# Patient Record
Sex: FEMALE | Race: WHITE | NOT HISPANIC OR LATINO | Employment: OTHER | URBAN - METROPOLITAN AREA
[De-identification: names, ages, dates, MRNs, and addresses within clinical notes are randomized per-mention and may not be internally consistent; named-entity substitution may affect disease eponyms.]

---

## 2017-07-07 ENCOUNTER — OUTSIDE FACILITY SERVICE (OUTPATIENT)
Dept: NEUROLOGY | Facility: CLINIC | Age: 52
End: 2017-07-07

## 2017-07-07 ENCOUNTER — HOSPITAL ENCOUNTER (OUTPATIENT)
Dept: NEUROLOGY | Facility: HOSPITAL | Age: 52
Discharge: HOME | End: 2017-07-07
Attending: NURSE PRACTITIONER | Admitting: NURSE PRACTITIONER

## 2017-07-07 PROCEDURE — 95908 NRV CNDJ TST 3-4 STUDIES: CPT | Performed by: PSYCHIATRY & NEUROLOGY

## 2017-07-07 PROCEDURE — 95886 MUSC TEST DONE W/N TEST COMP: CPT | Performed by: PSYCHIATRY & NEUROLOGY

## 2018-05-18 ENCOUNTER — OUTSIDE FACILITY SERVICE (OUTPATIENT)
Dept: NEUROLOGY | Facility: CLINIC | Age: 53
End: 2018-05-18

## 2018-05-18 ENCOUNTER — HOSPITAL ENCOUNTER (OUTPATIENT)
Dept: MAMMOGRAPHY | Facility: HOSPITAL | Age: 53
Discharge: HOME | End: 2018-05-18
Attending: NURSE PRACTITIONER | Admitting: NURSE PRACTITIONER

## 2018-05-18 PROCEDURE — 95908 NRV CNDJ TST 3-4 STUDIES: CPT | Performed by: PSYCHIATRY & NEUROLOGY

## 2018-05-18 PROCEDURE — 95886 MUSC TEST DONE W/N TEST COMP: CPT | Performed by: PSYCHIATRY & NEUROLOGY

## 2018-11-30 ENCOUNTER — HOSPITAL ENCOUNTER (OUTPATIENT)
Dept: RHEUMATOLOGY | Facility: CLINIC | Age: 53
Discharge: HOME | End: 2018-11-30
Attending: INTERNAL MEDICINE | Admitting: INTERNAL MEDICINE

## 2018-12-06 ENCOUNTER — HOSPITAL ENCOUNTER (OUTPATIENT)
Dept: LAB | Facility: HOSPITAL | Age: 53
Discharge: HOME | End: 2018-12-06
Attending: INTERNAL MEDICINE | Admitting: INTERNAL MEDICINE

## 2018-12-06 LAB
ALBUMIN SERPL-MCNC: 3.8 G/DL (ref 3.5–4.8)
ALBUMIN SERPL-MCNC: 4 G/DL (ref 3.5–4.8)
ALBUMIN/GLOB SERPL: 1.2 {RATIO} (ref 1–1.7)
ALP SERPL-CCNC: 29 IU/L (ref 32–91)
ALPHA1 GLOB FLD ELPH-MCNC: 0.3 GM/DL (ref 0.1–0.4)
ALPHA2 GLOB SERPL ELPH-MCNC: 0.8 GM/DL (ref 0.5–1)
ALT SERPL-CCNC: 32 IU/L (ref 14–54)
ANION GAP SERPL CALC-SCNC: 12.5 MMOL/L (ref 10–20)
AST SERPL-CCNC: 62 IU/L (ref 15–41)
B-GLOBULIN SERPL ELPH-MCNC: 1.2 GM/DL (ref 0.7–1.4)
BILIRUB SERPL-MCNC: 0.7 MG/DL (ref 0.3–1.2)
BUN SERPL-MCNC: 21 MG/DL (ref 8–20)
BUN/CREAT SERPL: 17.5 (ref 5.4–26.2)
CALCIUM SERPL-MCNC: 9.6 MG/DL (ref 8.9–10.3)
CHLORIDE SERPL-SCNC: 97 MMOL/L (ref 101–111)
CK SERPL-CCNC: 62 IU/L (ref 38–234)
CONV CO2: 28 MMOL/L (ref 22–32)
CONV TOTAL PROTEIN: 6.8 G/DL (ref 6.1–7.9)
CONV TOTAL PROTEIN: 6.9 G/DL (ref 6.1–7.9)
CREAT UR-MCNC: 1.2 MG/DL (ref 0.4–1)
CRP SERPL-MCNC: 1.18 MG/DL (ref 0–0.7)
ERYTHROCYTE [DISTWIDTH] IN BLOOD BY AUTOMATED COUNT: 14.1 % (ref 11.5–14.5)
ERYTHROCYTE [SEDIMENTATION RATE] IN BLOOD BY WESTERGREN METHOD: 24 MM/HR (ref 0–30)
GAMMA GLOB SERPL ELPH-MCNC: 0.6 GM/DL (ref 0.6–1.6)
GLOBULIN UR ELPH-MCNC: 3.1 G/DL (ref 2.5–3.8)
GLUCOSE SERPL-MCNC: 261 MG/DL (ref 65–99)
HCT VFR BLD AUTO: 37.6 % (ref 35–49)
HGB BLD-MCNC: 12.5 G/DL (ref 12–15)
INSULIN SERPL-ACNC: NORMAL U[IU]/ML
MCH RBC QN AUTO: 29.6 PG (ref 26–32)
MCHC RBC AUTO-ENTMCNC: 33.2 G/DL (ref 32–36)
MCV RBC AUTO: 89.2 FL (ref 80–94)
PLATELET # BLD AUTO: 221 10*3/UL (ref 150–450)
PMV BLD AUTO: 10.4 FL (ref 7.4–10.4)
POTASSIUM SERPL-SCNC: 4.5 MMOL/L (ref 3.6–5.1)
RBC # BLD AUTO: 4.22 10*6/UL (ref 4–5.4)
SODIUM SERPL-SCNC: 133 MMOL/L (ref 136–144)
TSH SERPL-ACNC: 3.38 UIU/ML (ref 0.34–5.6)
WBC # BLD AUTO: 7 10*3/UL (ref 4.5–11.5)

## 2018-12-06 PROCEDURE — 86481 TB AG RESPONSE T-CELL SUSP: CPT

## 2018-12-07 ENCOUNTER — LAB REQUISITION (OUTPATIENT)
Dept: LAB | Facility: HOSPITAL | Age: 53
End: 2018-12-07

## 2018-12-07 DIAGNOSIS — Z00.00 ROUTINE GENERAL MEDICAL EXAMINATION AT A HEALTH CARE FACILITY: ICD-10-CM

## 2018-12-07 LAB
25(OH)D3 SERPL-MCNC: 37 NG/ML (ref 30–100)
HAV IGM SERPL QL IA: NONREACTIVE
HBV CORE IGM SERPL QL IA: NONREACTIVE
HBV SURFACE AG SERPL QL IA: NONREACTIVE
HCV AB SER DONR QL: NONREACTIVE

## 2018-12-08 LAB
TSPOT INTERPRETATION: NEGATIVE
TSPOT NIL CONTROL INTERPRETATION: NORMAL
TSPOT PANEL A: 0
TSPOT PANEL B: 0
TSPOT POS CONTROL INTERPRETATION: NORMAL

## 2018-12-10 LAB
ACE SERPL-CCNC: 8 U/L (ref 9–67)
TSPOT INTERPRETATION: NEGATIVE

## 2019-07-17 ENCOUNTER — OFFICE VISIT (OUTPATIENT)
Dept: RHEUMATOLOGY | Facility: CLINIC | Age: 54
End: 2019-07-17

## 2019-07-17 VITALS
DIASTOLIC BLOOD PRESSURE: 75 MMHG | SYSTOLIC BLOOD PRESSURE: 125 MMHG | HEART RATE: 62 BPM | HEIGHT: 64 IN | WEIGHT: 194 LBS | BODY MASS INDEX: 33.12 KG/M2

## 2019-07-17 DIAGNOSIS — E11.610 TYPE 2 DIABETES MELLITUS WITH DIABETIC NEUROPATHIC ARTHROPATHY, WITHOUT LONG-TERM CURRENT USE OF INSULIN (HCC): Primary | ICD-10-CM

## 2019-07-17 DIAGNOSIS — R76.8 POSITIVE ANA (ANTINUCLEAR ANTIBODY): ICD-10-CM

## 2019-07-17 DIAGNOSIS — G89.29 CHRONIC BILATERAL LOW BACK PAIN WITHOUT SCIATICA: ICD-10-CM

## 2019-07-17 DIAGNOSIS — R79.89 ABNORMAL LIVER FUNCTION TESTS: ICD-10-CM

## 2019-07-17 DIAGNOSIS — M06.4 UNDIFFERENTIATED INFLAMMATORY POLYARTHRITIS (HCC): ICD-10-CM

## 2019-07-17 DIAGNOSIS — M54.50 CHRONIC BILATERAL LOW BACK PAIN WITHOUT SCIATICA: ICD-10-CM

## 2019-07-17 PROBLEM — N18.30 CHRONIC RENAL INSUFFICIENCY, STAGE III (MODERATE): Status: ACTIVE | Noted: 2019-01-09

## 2019-07-17 PROBLEM — M54.9 BACK PAIN: Status: ACTIVE | Noted: 2018-11-30

## 2019-07-17 PROCEDURE — 99214 OFFICE O/P EST MOD 30 MIN: CPT | Performed by: INTERNAL MEDICINE

## 2019-07-17 RX ORDER — FLUOXETINE 20 MG/1
1 TABLET, FILM COATED ORAL DAILY
COMMUNITY
Start: 2019-07-15 | End: 2021-09-28

## 2019-07-17 RX ORDER — GLYCOPYRROLATE 2 MG/1
1 TABLET ORAL 2 TIMES DAILY
COMMUNITY
Start: 2017-11-22

## 2019-07-17 RX ORDER — CETIRIZINE HYDROCHLORIDE 10 MG/1
1 TABLET ORAL DAILY
COMMUNITY

## 2019-07-17 RX ORDER — GABAPENTIN 300 MG/1
300 CAPSULE ORAL 4 TIMES DAILY
COMMUNITY
End: 2020-01-20 | Stop reason: SDUPTHER

## 2019-07-17 RX ORDER — HYDROXYCHLOROQUINE SULFATE 200 MG/1
1 TABLET, FILM COATED ORAL DAILY
Refills: 1 | COMMUNITY
Start: 2019-05-13 | End: 2019-07-17 | Stop reason: SDUPTHER

## 2019-07-17 RX ORDER — OMEPRAZOLE 40 MG/1
40 CAPSULE, DELAYED RELEASE ORAL DAILY
Refills: 3 | COMMUNITY
Start: 2019-07-05

## 2019-07-17 RX ORDER — LANCETS 28 GAUGE
EACH MISCELLANEOUS
COMMUNITY
Start: 2016-08-16 | End: 2021-07-27 | Stop reason: SDUPTHER

## 2019-07-17 RX ORDER — SIMVASTATIN 20 MG
1 TABLET ORAL DAILY
COMMUNITY
Start: 2019-07-15 | End: 2019-08-27 | Stop reason: SDUPTHER

## 2019-07-17 RX ORDER — LISINOPRIL 5 MG/1
1 TABLET ORAL DAILY
COMMUNITY
Start: 2019-07-16

## 2019-07-17 RX ORDER — HYDROXYCHLOROQUINE SULFATE 200 MG/1
200 TABLET, FILM COATED ORAL DAILY
Qty: 90 TABLET | Refills: 1 | Status: SHIPPED | OUTPATIENT
Start: 2019-07-17 | End: 2020-01-20 | Stop reason: SDUPTHER

## 2019-07-17 NOTE — PATIENT INSTRUCTIONS
Diabetes and Exercise  Diabetes mellitus is a common, chronic disease in which a person's blood sugar (glucose) levels are often too high. Getting exercise on a regular basis is an important part of diabetes treatment.  WHY SHOULD I EXERCISE REGULARLY IF I HAVE DIABETES?  Exercising regularly provides many benefits for people who have diabetes. Some of these benefits include:  · Lowering your blood glucose level.  · Maintaining a healthy body weight and structure.  · Reducing your risk of heart disease by:    Lowering your LDL cholesterol levels. LDL is sometimes called bad cholesterol.    Lowering your triglyceride levels.    Raising your HDL cholesterol levels. HDL is sometimes called good cholesterol.    Decreasing your blood pressure.  · Lowering your hemoglobin A1C levels. A1C is a blood test that determines your average blood glucose level over a 3-month period. Doing resistance exercises on a regular basis can help to lower these levels.  · Improving your body's ability to use insulin and glucose.  WHAT TYPES OF EXERCISE ARE RECOMMENDED FOR PEOPLE WHO HAVE DIABETES?  A combination of resistance exercises and aerobic exercise is recommended for people who have diabetes. Resistance exercises are those that you do with free weights or weight machines. Aerobic exercise includes any exercise that raises your heart rate and breathing rate for a sustained amount of time. This can include activities such as:  · Brisk walking.  · Water or dry-land aerobics.  · Bicycling or riding a stationary bike.  · Jogging.  · Dancing.  · Hiking.  · Moderate to vigorous gardening.  Ask your health care provider what types of exercise are safe for you.  HOW LONG AND HOW OFTEN SHOULD I EXERCISE?  The American Diabetes Association and the [place] Department of Health recommend the following:  · Children who have diabetes or are at risk of developing diabetes (have prediabetes) should get 1 hour or more of exercise every day.  · Adults  who have diabetes should exercise with moderate intensity for 150 minutes or more per week. Moderate-intensity exercise is any exercise that raises your heart rate to 50-70% of your maximum heart rate. Ask your health care provider how to calculate this.  · Adults who have diabetes should spread exercise over at least 3 days per week, but they should not go more than 2 days in a row without exercising.  · Adults who have diabetes should do resistance exercise at least 2 days per week.  · Adults who have diabetes should avoid sitting for more than 90 minutes at a time.  WHAT SHOULD I DO BEFORE I START AN EXERCISE PROGRAM?  · Talk with your health care provider before you start a new exercise program. If you have, or are at risk for, certain medical conditions, you may need to have a physical exam and testing. Testing may include:    A chest X-ray.    An electrocardiogram (ECG). This test checks the electrical functioning of your heart.    Blood tests.  · If you have type 1 diabetes, talk with your health care provider about managing your blood glucose levels with insulin before, during, and after exercise.  · Understand that exercise affects blood glucose levels differently depending on how long you exercise and depending on other factors such as whether you are sick. Be ready to treat high blood glucose (hyperglycemia) and low blood glucose (hypoglycemia) right away if either occurs during or after exercise.  · Talk with your health care provider about diabetes-related problems that can occur during exercise. Your health care provider can help you to decide on an exercise plan that allows you to avoid these problems. This plan might include instructions about choosing the proper footwear for exercise and staying well hydrated during and after exercise.  HOW CAN I MANAGE MY BLOOD GLUCOSE LEVEL WHILE I EXERCISE?  · Eat 1-3 hours before you exercise.  · Check your blood glucose level immediately before and after  exercise.  · Do not start exercising if:    Your blood glucose is more than 250 mg/dL and you do not feel well.    You have ketones in your urine.    Your blood glucose is less than 100 mg/dL.  · If you do not feel well while exercising, take a break and check your blood glucose.  · Stop exercising if you find that your blood glucose has dropped below 100 mg/dL. If this occurs, do the following:    Eat a 15-gram to 20-gram carbohydrate-rich snack.    Recheck your blood glucose level.    If your blood glucose level does not rise above 100 mg/dL, have another 15-gram to 20-gram carbohydrate snack.  · Stop exercising if you find that your blood glucose has risen above 250 mg/dL while exercising.  · Do not go on with your workout until:    Your blood glucose level rises above 100 mg/dL if it was below this level.    Your blood glucose level drops below 250 mg/dL if it was above this level.  · If you take insulin, you may need to alter your insulin schedule on the days that you exercise. This depends on how your blood glucose responds to exercise. Ask your health care provider how to do this.  · Drink fluids during and after exercise to avoid dehydration. For exercise that lasts a long time, use a sports drink to maintain your glucose level.  SEEK MEDICAL CARE IF:  · You have stopped exercising because of hypoglycemia and your blood glucose does not rise above 100 mg/dL after you have two 15-gram to 20-gram carbohydrate-rich snacks.  · You notice a loss of sensation in your feet during or after exercise.  · You have increased numbness, tingling, or pins-and-needles sensations after exercise.  · You have a fast, irregular heartbeat (palpitations) during or after exercise.  · Your exercise tolerance gets worse.  · You have dizzy spells or feel faint for brief periods during or after exercise.  SEEK IMMEDIATE MEDICAL CARE IF:  · You have chest pain during or after exercise.  · You pass out (lose consciousness) during or  after exercise.  · You have the following in addition to hyperglycemia:    Fatigue.    Dry or flushed skin.    Difficulty breathing.    Confusion.    Nausea, vomiting, or pain in the abdomen.    Fruity-smelling breath.     This information is not intended to replace advice given to you by your health care provider. Make sure you discuss any questions you have with your health care provider.     Document Released: 12/18/2006 Document Revised: 09/07/2016 Document Reviewed: 02/16/2016  [employer] Interactive Patient Education ©2017 [employer]

## 2019-07-17 NOTE — PROGRESS NOTES
Subjective   Chief Complaint   Patient presents with   • Joint Pain        Faye De La Fuente is a 53 y.o. female.     History of Present Illness    She is a 53 years old overnourished white female with a history of the diabetes and also arthritis and hip and back pains and abnormal liver function tests diagnosed with a fatty liver that currently take Plaquenil 1 a day and also taking Neurontin 300 mg 3-4 times a day.  She claimed that her diabetes is fairly good control and watching her diet and lost a few pounds.  Also have some numbness and tingling in her hands and the feet and was told that she had diabetic neuropathy.  She has persistent elevation of the liver enzyme due to the fatty liver.  She has morning stiffness immobility stiffness but overall does not last for long.  Of the time.  And she had a eye examination done recently which was okay as far as the Plaquenil toxicity or concern.  No fever no chills.  She has positive ARTHUR test but so far does not have any significant clinical feature of the lupus such as pleuritic pain or rainouts or sores in the mouth or sores in her nose.  She has off-and-on pains and aches in her joints and lower back.    The following portions of the patient's history were reviewed and updated as appropriate: allergies, current medications, past family history, past medical history, past social history, past surgical history and problem list.    Past Medical History:   Diagnosis Date   • CAD (coronary artery disease)    • Diabetes mellitus (CMS/HCC)    • Diabetes mellitus type I (CMS/HCC)    • GERD (gastroesophageal reflux disease)    • Hyperlipidemia    • Hypertension    • Myocardial infarction (CMS/HCC) 2011   • Vitamin D deficiency       Past Surgical History:   Procedure Laterality Date   • BLADDER SURGERY  2011    bladder mesh   • CHOLECYSTECTOMY     • COLONOSCOPY     • ENDOSCOPY     • EYE SURGERY  2018   • HYSTERECTOMY  2009     Family History   Problem Relation Age of  Onset   • Diabetes Mother    • Hypertension Mother    • Diabetes Father    • Hypertension Father    • Stroke Father    • Diabetes Paternal Grandmother    • Diabetes Paternal Grandfather    • Diabetes Other         aunt   • Cancer Other         brain cancer - uncle   • Cancer Other         breast cancer - aunt      Social History     Socioeconomic History   • Marital status:      Spouse name: Peterson De La Fuente   • Number of children: 3   • Years of education: Not on file   • Highest education level: Not on file   Tobacco Use   • Smoking status: Never Smoker   • Smokeless tobacco: Never Used   Substance and Sexual Activity   • Alcohol use: No     Frequency: Never   • Drug use: No     Allergies   Allergen Reactions   • Hydrocodone Unknown (See Comments)   • Sulfamethoxazole-Trimethoprim Unknown (See Comments)   • Tramadol Hcl Unknown (See Comments)   • Ciprofloxacin Unknown (See Comments)        Current Outpatient Medications:   •  aspirin 81 MG tablet, Take 1 tablet by mouth Daily., Disp: , Rfl:   •  cetirizine (zyrTEC) 10 MG tablet, Take 1 tablet by mouth Daily., Disp: , Rfl:   •  FLUoxetine (PROzac) 20 MG tablet, Take 1 tablet by mouth Daily., Disp: , Rfl:   •  gabapentin (NEURONTIN) 300 MG capsule, Take 300 mg by mouth 4 (Four) Times a Day., Disp: , Rfl:   •  glucagon (GLUCAGON EMERGENCY) 1 MG injection, Take As Directed., Disp: , Rfl:   •  glucose blood (FREESTYLE LITE) test strip, FREESTYLE LITE TEST STRP, Disp: , Rfl:   •  glycopyrrolate (ROBINUL) 2 MG tablet, Take 1 tablet by mouth 2 (Two) Times a Day., Disp: , Rfl:   •  hydroxychloroquine (PLAQUENIL) 200 MG tablet, Take 1 tablet by mouth Daily., Disp: 90 tablet, Rfl: 1  •  insulin glulisine (APIDRA) 100 UNIT/ML injection, Use as directed in insulin pump,  units per day. DX: E10.65, Disp: 150 mL, Rfl: 0  •  Lancets (FREESTYLE) lancets, 5 times daily, Disp: , Rfl:   •  lisinopril (PRINIVIL,ZESTRIL) 5 MG tablet, Take 1 tablet by mouth Daily., Disp: ,  Rfl:   •  metoprolol tartrate (LOPRESSOR) 25 MG tablet, Take 25 mg by mouth 2 (Two) Times a Day., Disp: , Rfl: 1  •  omeprazole (priLOSEC) 40 MG capsule, Take 40 mg by mouth Daily., Disp: , Rfl: 3  •  simvastatin (ZOCOR) 20 MG tablet, Take 1 tablet by mouth Daily., Disp: , Rfl:      Review of System    Appetite is okay she does not have any significant GI  or respiratory problem at the present time.  No significant ears nose throat problem.  Except for lower back pain and joint pain otherwise she is doing reasonably okay.  Rest of the review of the system is unremarkable.    Objective   Physical Exam    She is well-built white female alert orientated cooperative does not appear in significant distress her gait and postures are fairly normal.  HEENT is unremarkable.  Chest is clear to auscultation percussion.  Cardiovascular.  S1 and S2 there is no S3 no murmur.  Abdomen.  There is no significant tenderness bowel sounds are present.  Neurologic exam: Deep tendon reflexes are sluggish in upper and lower extremities toe flexor and extensor are normal vibration tests are normal sensation to pinprick is fairly normal.  Joint exam: Range of motion of the joint there is mild decreased range of motion of the spine and the hips.  Joint tenderness there is mild tenderness over the lumbar spine and also shoulders and couple of metacarpal phalanges PIP joints and knees.  Joint swelling.  There is no significant swelling in any joints.  Muscle exam she has very minimal muscle weakness in the lower extremities  are also slightly decreased.  There is no muscle atrophy.  His skin there is no significant rash no subcutaneous notes no pedal edema very minimal varicose of the vein.  No significant calf tenderness.    Assessment/Plan   Problem List Items Addressed This Visit        Endocrine    Diabetes mellitus (CMS/[facility]) - Primary    Relevant Medications    insulin glulisine (APIDRA) 100 UNIT/ML injection    glucagon (GLUCAGON  EMERGENCY) 1 MG injection       Nervous and Auditory    Back pain    Relevant Medications    hydroxychloroquine (PLAQUENIL) 200 MG tablet    Other Relevant Orders    Comprehensive Metabolic Panel    CBC Auto Differential    C-reactive Protein    C3 Complement    C4 Complement    Sedimentation Rate    Urinalysis, Microscopic Only - Urine, Clean Catch       Musculoskeletal and Integument    Undifferentiated inflammatory polyarthritis (CMS/HCC)    Relevant Medications    hydroxychloroquine (PLAQUENIL) 200 MG tablet    Other Relevant Orders    Comprehensive Metabolic Panel    CBC Auto Differential    C-reactive Protein    C3 Complement    C4 Complement    Sedimentation Rate    Urinalysis, Microscopic Only - Urine, Clean Catch       Other    Abnormal liver function tests    Relevant Medications    hydroxychloroquine (PLAQUENIL) 200 MG tablet    Other Relevant Orders    Comprehensive Metabolic Panel    CBC Auto Differential    C-reactive Protein    C3 Complement    C4 Complement    Sedimentation Rate    Urinalysis, Microscopic Only - Urine, Clean Catch    Positive ARTHUR (antinuclear antibody)    Relevant Medications    hydroxychloroquine (PLAQUENIL) 200 MG tablet    Other Relevant Orders    Comprehensive Metabolic Panel    CBC Auto Differential    C-reactive Protein    C3 Complement    C4 Complement    Sedimentation Rate    Urinalysis, Microscopic Only - Urine, Clean Catch          I suggested continue with the current medication and exercise regularly watch her diet and continue to lose weight and monitor hemoglobin A1c.  Patient also advised that participate in aquatic exercise program during summertime.  And avoid have any alcoholic beverage because of abnormal liver function test.  I will see her again sometimes in December and have her blood work 710 days prior to the next office visit.  Patient Instructions   Diabetes and Exercise  Diabetes mellitus is a common, chronic disease in which a person's blood sugar  (glucose) levels are often too high. Getting exercise on a regular basis is an important part of diabetes treatment.  WHY SHOULD I EXERCISE REGULARLY IF I HAVE DIABETES?  Exercising regularly provides many benefits for people who have diabetes. Some of these benefits include:  · Lowering your blood glucose level.  · Maintaining a healthy body weight and structure.  · Reducing your risk of heart disease by:    Lowering your LDL cholesterol levels. LDL is sometimes called bad cholesterol.    Lowering your triglyceride levels.    Raising your HDL cholesterol levels. HDL is sometimes called good cholesterol.    Decreasing your blood pressure.  · Lowering your hemoglobin A1C levels. A1C is a blood test that determines your average blood glucose level over a 3-month period. Doing resistance exercises on a regular basis can help to lower these levels.  · Improving your body's ability to use insulin and glucose.  WHAT TYPES OF EXERCISE ARE RECOMMENDED FOR PEOPLE WHO HAVE DIABETES?  A combination of resistance exercises and aerobic exercise is recommended for people who have diabetes. Resistance exercises are those that you do with free weights or weight machines. Aerobic exercise includes any exercise that raises your heart rate and breathing rate for a sustained amount of time. This can include activities such as:  · Brisk walking.  · Water or dry-land aerobics.  · Bicycling or riding a stationary bike.  · Jogging.  · Dancing.  · Hiking.  · Moderate to vigorous gardening.  Ask your health care provider what types of exercise are safe for you.  HOW LONG AND HOW OFTEN SHOULD I EXERCISE?  The American Diabetes Association and the [place] Department of Health recommend the following:  · Children who have diabetes or are at risk of developing diabetes (have prediabetes) should get 1 hour or more of exercise every day.  · Adults who have diabetes should exercise with moderate intensity for 150 minutes or more per week.  Moderate-intensity exercise is any exercise that raises your heart rate to 50-70% of your maximum heart rate. Ask your health care provider how to calculate this.  · Adults who have diabetes should spread exercise over at least 3 days per week, but they should not go more than 2 days in a row without exercising.  · Adults who have diabetes should do resistance exercise at least 2 days per week.  · Adults who have diabetes should avoid sitting for more than 90 minutes at a time.  WHAT SHOULD I DO BEFORE I START AN EXERCISE PROGRAM?  · Talk with your health care provider before you start a new exercise program. If you have, or are at risk for, certain medical conditions, you may need to have a physical exam and testing. Testing may include:    A chest X-ray.    An electrocardiogram (ECG). This test checks the electrical functioning of your heart.    Blood tests.  · If you have type 1 diabetes, talk with your health care provider about managing your blood glucose levels with insulin before, during, and after exercise.  · Understand that exercise affects blood glucose levels differently depending on how long you exercise and depending on other factors such as whether you are sick. Be ready to treat high blood glucose (hyperglycemia) and low blood glucose (hypoglycemia) right away if either occurs during or after exercise.  · Talk with your health care provider about diabetes-related problems that can occur during exercise. Your health care provider can help you to decide on an exercise plan that allows you to avoid these problems. This plan might include instructions about choosing the proper footwear for exercise and staying well hydrated during and after exercise.  HOW CAN I MANAGE MY BLOOD GLUCOSE LEVEL WHILE I EXERCISE?  · Eat 1-3 hours before you exercise.  · Check your blood glucose level immediately before and after exercise.  · Do not start exercising if:    Your blood glucose is more than 250 mg/dL and you do  not feel well.    You have ketones in your urine.    Your blood glucose is less than 100 mg/dL.  · If you do not feel well while exercising, take a break and check your blood glucose.  · Stop exercising if you find that your blood glucose has dropped below 100 mg/dL. If this occurs, do the following:    Eat a 15-gram to 20-gram carbohydrate-rich snack.    Recheck your blood glucose level.    If your blood glucose level does not rise above 100 mg/dL, have another 15-gram to 20-gram carbohydrate snack.  · Stop exercising if you find that your blood glucose has risen above 250 mg/dL while exercising.  · Do not go on with your workout until:    Your blood glucose level rises above 100 mg/dL if it was below this level.    Your blood glucose level drops below 250 mg/dL if it was above this level.  · If you take insulin, you may need to alter your insulin schedule on the days that you exercise. This depends on how your blood glucose responds to exercise. Ask your health care provider how to do this.  · Drink fluids during and after exercise to avoid dehydration. For exercise that lasts a long time, use a sports drink to maintain your glucose level.  SEEK MEDICAL CARE IF:  · You have stopped exercising because of hypoglycemia and your blood glucose does not rise above 100 mg/dL after you have two 15-gram to 20-gram carbohydrate-rich snacks.  · You notice a loss of sensation in your feet during or after exercise.  · You have increased numbness, tingling, or pins-and-needles sensations after exercise.  · You have a fast, irregular heartbeat (palpitations) during or after exercise.  · Your exercise tolerance gets worse.  · You have dizzy spells or feel faint for brief periods during or after exercise.  SEEK IMMEDIATE MEDICAL CARE IF:  · You have chest pain during or after exercise.  · You pass out (lose consciousness) during or after exercise.  · You have the following in addition to hyperglycemia:    Fatigue.    Dry or  flushed skin.    Difficulty breathing.    Confusion.    Nausea, vomiting, or pain in the abdomen.    Fruity-smelling breath.     This information is not intended to replace advice given to you by your health care provider. Make sure you discuss any questions you have with your health care provider.     Document Released: 12/18/2006 Document Revised: 09/07/2016 Document Reviewed: 02/16/2016  [employer] Interactive Patient Education ©2017 [employer]

## 2019-08-20 PROBLEM — Z82.3 FAMILY HISTORY OF STROKE: Status: ACTIVE | Noted: 2019-08-20

## 2019-08-20 PROBLEM — G62.9 NEUROPATHY: Status: ACTIVE | Noted: 2017-11-22

## 2019-08-20 PROBLEM — M54.9 BACK PAIN WITH RADIATION: Status: ACTIVE | Noted: 2017-12-01

## 2019-08-20 PROBLEM — M25.551 HIP PAIN, RIGHT: Status: ACTIVE | Noted: 2018-11-30

## 2019-08-20 PROBLEM — M79.643 PAIN OF HAND: Status: ACTIVE | Noted: 2018-11-30

## 2019-08-20 PROBLEM — M54.16 LUMBAR RADICULOPATHY: Status: ACTIVE | Noted: 2017-12-01

## 2019-08-20 PROBLEM — Z83.3 FAMILY HISTORY OF DIABETES MELLITUS: Status: ACTIVE | Noted: 2019-08-20

## 2019-08-20 RX ORDER — ERGOCALCIFEROL 1.25 MG/1
50000 CAPSULE ORAL 2 TIMES WEEKLY
COMMUNITY
Start: 2013-10-03 | End: 2019-08-27 | Stop reason: ALTCHOICE

## 2019-08-20 RX ORDER — BLOOD-GLUCOSE METER
KIT MISCELLANEOUS
COMMUNITY
Start: 2018-11-16

## 2019-08-20 RX ORDER — DICYCLOMINE HYDROCHLORIDE 10 MG/1
10 CAPSULE ORAL 3 TIMES DAILY
COMMUNITY
End: 2019-08-27 | Stop reason: ALTCHOICE

## 2019-08-20 RX ORDER — FLUOXETINE HYDROCHLORIDE 20 MG/1
CAPSULE ORAL
COMMUNITY
Start: 2017-08-24 | End: 2019-08-27

## 2019-08-27 ENCOUNTER — OFFICE VISIT (OUTPATIENT)
Dept: ENDOCRINOLOGY | Facility: CLINIC | Age: 54
End: 2019-08-27

## 2019-08-27 VITALS
HEART RATE: 61 BPM | OXYGEN SATURATION: 98 % | HEIGHT: 62 IN | DIASTOLIC BLOOD PRESSURE: 62 MMHG | WEIGHT: 195 LBS | SYSTOLIC BLOOD PRESSURE: 118 MMHG | BODY MASS INDEX: 35.88 KG/M2

## 2019-08-27 DIAGNOSIS — I10 HYPERTENSION, BENIGN: ICD-10-CM

## 2019-08-27 DIAGNOSIS — N18.30 CHRONIC RENAL INSUFFICIENCY, STAGE III (MODERATE) (HCC): ICD-10-CM

## 2019-08-27 DIAGNOSIS — E78.2 MIXED HYPERLIPIDEMIA: ICD-10-CM

## 2019-08-27 DIAGNOSIS — E10.65 TYPE 1 DIABETES MELLITUS WITH HYPERGLYCEMIA (HCC): Primary | ICD-10-CM

## 2019-08-27 DIAGNOSIS — E11.42 DIABETIC PERIPHERAL NEUROPATHY (HCC): ICD-10-CM

## 2019-08-27 PROCEDURE — 99214 OFFICE O/P EST MOD 30 MIN: CPT | Performed by: INTERNAL MEDICINE

## 2019-08-27 RX ORDER — SIMVASTATIN 40 MG
20 TABLET ORAL
COMMUNITY
Start: 2019-07-25 | End: 2021-09-28

## 2019-08-27 NOTE — PATIENT INSTRUCTIONS
Continue current medications and follow-up in months with labs.  Please keep glucose source with you all the time and check blood sugars before driving and needs to be above 100 and keep Glucagon Emergency Kit ready all the time.  Is get annual eye exam and flu shot.

## 2019-08-27 NOTE — PROGRESS NOTES
Endocrine Progress Note Outpatient     Patient Care Team:  Nalini Quezada APRN as PCP - General    Chief Complaint: Follow-up type 1 diabetes    HPI: 53-year-old female with history of type 1 diabetes since 1997, also history of hypertension, hyperlipidemia and diabetic neuropathy is here for follow-up.  For type 2 diabetes she is currently on [employer] insulin pump, current model is 530.  Her insulin pump settings are as follows basal rate: Midnight 2.45 units/h, 4 AM 2.85 units/h, 6 AM 2.95 units/h, 10 AM 2.75 units/h.  Insulin carb ratios midnight 1 unit for each 4.3 g of carbohydrate, 4 PM 1 unit for each 3.7 g of carbohydrate.  Insulin sensitive factor was 1 unit for each 10 mg blood sugar with a target blood sugar of 100-1 35 and active insulin 4 hours.  Feels like his current settings are working pretty good for her.  Unfortunately she did not bring blood sugar records for review and we are not able to download her insulin pump due to some technical issues.  Hypertension: Well-controlled  Hyperlipidemia: On simvastatin and fenofibrate  Diabetic neuropathy: She has right foot drop    Past Medical History:   Diagnosis Date   • CAD (coronary artery disease)    • Diabetes mellitus (CMS/[facility])    • Diabetes mellitus type I (CMS/[facility])    • GERD (gastroesophageal reflux disease)    • Hyperlipidemia    • Hypertension    • Myocardial infarction (CMS/[facility]) 2011   • Vitamin D deficiency        Social History     Socioeconomic History   • Marital status:      Spouse name: Peterson De La Fuente   • Number of children: 3   • Years of education: Not on file   • Highest education level: Not on file   Tobacco Use   • Smoking status: Never Smoker   • Smokeless tobacco: Never Used   Substance and Sexual Activity   • Alcohol use: No     Frequency: Never   • Drug use: No       Family History   Problem Relation Age of Onset   • Diabetes Mother    • Hypertension Mother    • Diabetes Father    • Hypertension Father    • Stroke  Father    • Diabetes Paternal Grandmother    • Diabetes Paternal Grandfather    • Diabetes Other         aunt   • Cancer Other         brain cancer - uncle   • Cancer Other         breast cancer - aunt       Allergies   Allergen Reactions   • Ciprofloxacin Unknown (See Comments)   • Hydrocodone Unknown (See Comments)   • Macadamia Nut Oil Unknown (See Comments)   • Nitrofurantoin Unknown (See Comments)   • Nuts Unknown (See Comments)   • Sulfamethoxazole-Trimethoprim Unknown (See Comments)   • Tramadol Hcl Unknown (See Comments)       ROS:   Constitutional:  Admit fatigue, tiredness.    Eyes:  Denies change in visual acuity   HENT:  Denies nasal congestion or sore throat   Respiratory: denies cough, shortness of breath.   Cardiovascular:  denies chest pain, edema   GI:  Denies abdominal pain, nausea, vomiting.   Musculoskeletal:  Denies back pain or joint pain   Integument:  Denies dry skin and rash   Neurologic:  Denies headache, focal weakness or sensory changes   Endocrine:  Denies polyuria or polydipsia   Psychiatric:  Denies depression or anxiety      Vitals:    08/27/19 1441   BP: 118/62   Pulse: 61   SpO2: 98%       Physical Exam:  GEN: NAD, conversant  EYES: EOMI, PERRL, no conjunctival erythema  NECK: no thyromegaly, full ROM   CV: RRR, no murmurs/rubs/gallops, no peripheral edema  LUNG: CTAB, no wheezes/rales/ronchi  SKIN: no rashes, no acanthosis  MSK: no deformities, full ROM of all extremities  NEURO: no tremors, DTR normal  PSYCH: AOX3, appropriate mood, affect normal  Feet: Has callus on left sole, no ulcer seen    Results Review:     I reviewed the patient's new clinical results.    No results found for: HGBA1C   Lab Results   Component Value Date    GLUCOSE 261 (H) 12/06/2018    BUN 21 (H) 12/06/2018    CREATININE 1.2 (H) 12/06/2018    BCR 17.5 12/06/2018    K 4.5 12/06/2018    CO2 28 12/06/2018    CALCIUM 9.6 12/06/2018    ALBUMIN 4.0 12/06/2018    ALBUMIN 3.8 12/06/2018    LABIL2 1.2 12/06/2018     AST 62 (H) 12/06/2018    ALT 32 12/06/2018     Lab Results   Component Value Date    TSH 3.38 12/06/2018     Labs from August 9, 2019 showed an A1c of 7.4%, BUN 19, creatinine 1.4, sodium 139, potassium 4.9, chloride 101, 0 25, glucose 173.    Medication Review: Reviewed.       Current Outpatient Medications:   •  aspirin 81 MG tablet, Take 1 tablet by mouth Daily., Disp: , Rfl:   •  Blood Glucose Monitoring Suppl (FREESTYLE LITE) device, FREESTYLE LITE VERENICE, Disp: , Rfl:   •  cetirizine (zyrTEC) 10 MG tablet, Take 1 tablet by mouth Daily., Disp: , Rfl:   •  FLUoxetine (PROzac) 20 MG tablet, Take 1 tablet by mouth Daily., Disp: , Rfl:   •  gabapentin (NEURONTIN) 300 MG capsule, Take 300 mg by mouth 4 (Four) Times a Day., Disp: , Rfl:   •  glucagon (GLUCAGON EMERGENCY) 1 MG injection, Take As Directed., Disp: , Rfl:   •  glucose blood (FREESTYLE LITE) test strip, FREESTYLE LITE TEST STRP, Disp: , Rfl:   •  glycopyrrolate (ROBINUL) 2 MG tablet, Take 1 tablet by mouth 2 (Two) Times a Day., Disp: , Rfl:   •  hydroxychloroquine (PLAQUENIL) 200 MG tablet, Take 1 tablet by mouth Daily., Disp: 90 tablet, Rfl: 1  •  insulin glulisine (APIDRA) 100 UNIT/ML injection, Use as directed in insulin pump,  units per day. DX: E10.65, Disp: 150 mL, Rfl: 0  •  Lancets (FREESTYLE) lancets, 5 times daily, Disp: , Rfl:   •  lisinopril (PRINIVIL,ZESTRIL) 5 MG tablet, Take 1 tablet by mouth Daily., Disp: , Rfl:   •  metoprolol tartrate (LOPRESSOR) 25 MG tablet, Take 25 mg by mouth 2 (Two) Times a Day., Disp: , Rfl: 1  •  omeprazole (priLOSEC) 40 MG capsule, Take 40 mg by mouth Daily., Disp: , Rfl: 3  •  simvastatin (ZOCOR) 40 MG tablet, , Disp: , Rfl:       Assessment/Plan   1.  Diabetes mellitus type 1: Fair control with A1c 7.4%.  She is in process to get a new insulin pump she is looking at Medtronic 670 G system.  So at this time we will continue current settings and follow blood sugars and A1c. Pt has been checking her BG's for  "4 times a day for the last 30 days.  2.  Hypertension: Well-controlled, continue current medication  3.  Hyperlipidemia: On simvastatin and fenofibrate, will continue current medications and follow lipid panel.  4.  Diabetic neuropathy: Stable.             Marely Peralta MD FACE.  06/15/19  4:34 PM      EMR Dragon / transcription disclaimer:     \"Dictated utilizing Dragon dictation\".                 "

## 2019-09-12 ENCOUNTER — TELEPHONE (OUTPATIENT)
Dept: ENDOCRINOLOGY | Facility: CLINIC | Age: 54
End: 2019-09-12

## 2019-09-12 NOTE — TELEPHONE ENCOUNTER
Navneet with [employer] called and LVM that Medicare is requiring pt's office notes to be appended saying:    Pt is checking at least 4 times per day for the last 60 days.      During 8/27/19 MD visit, pt did not bring written down BG's and office was unable to download her pump.      Called pt and requested that she send BG's showing that she is checking her BG's 4 times per day and then CDE will ask MD to append last office visit.  Pt verbalized an understanding. Pt states she will drop them off in 1-2 weeks.

## 2019-09-17 ENCOUNTER — OFFICE VISIT (OUTPATIENT)
Dept: CARDIOLOGY | Facility: CLINIC | Age: 54
End: 2019-09-17

## 2019-09-17 VITALS
HEART RATE: 61 BPM | HEIGHT: 62 IN | SYSTOLIC BLOOD PRESSURE: 139 MMHG | DIASTOLIC BLOOD PRESSURE: 75 MMHG | WEIGHT: 180 LBS | BODY MASS INDEX: 33.13 KG/M2 | OXYGEN SATURATION: 96 %

## 2019-09-17 DIAGNOSIS — I25.10 CHRONIC CORONARY ARTERY DISEASE: Primary | ICD-10-CM

## 2019-09-17 DIAGNOSIS — E78.00 PURE HYPERCHOLESTEROLEMIA: ICD-10-CM

## 2019-09-17 DIAGNOSIS — N18.1 CHRONIC RENAL INSUFFICIENCY, STAGE 1: ICD-10-CM

## 2019-09-17 DIAGNOSIS — I10 HYPERTENSION, BENIGN: ICD-10-CM

## 2019-09-17 DIAGNOSIS — E10.65 TYPE 1 DIABETES MELLITUS WITH HYPERGLYCEMIA (HCC): ICD-10-CM

## 2019-09-17 PROCEDURE — 99214 OFFICE O/P EST MOD 30 MIN: CPT | Performed by: INTERNAL MEDICINE

## 2019-09-17 RX ORDER — ACETAMINOPHEN AND CODEINE PHOSPHATE 300; 30 MG/1; MG/1
1 TABLET ORAL 2 TIMES DAILY PRN
Refills: 0 | COMMUNITY
Start: 2019-09-06 | End: 2020-02-27

## 2019-09-17 RX ORDER — TIZANIDINE 4 MG/1
TABLET ORAL
Refills: 1 | COMMUNITY
Start: 2019-08-30 | End: 2021-09-28

## 2019-09-17 NOTE — PROGRESS NOTES
"    Subjective:     Encounter Date:09/17/2019      Patient ID: Faye De La Fuente is a 53 y.o. female.    Chief Complaint:  History of Present Illness 53-year-old white female with history of coronary disease history of hypertension hyperlipidemia diabetes and chronic renal insufficiency presents to my office for follow-up.  Patient is currently stable without any symptoms of chest pain or shortness of breath at rest or exertion.  No complaints of any PND orthopnea.  No palpitations dizziness syncope or swelling of the feet.  She is taking her medicines regularly.  She does not smoke.  She is trying to exercise regularly.  She follows a good diet.    The following portions of the patient's history were reviewed and updated as appropriate: allergies, current medications, past family history, past medical history, past social history, past surgical history and problem list.  Past Medical History:   Diagnosis Date   • CAD (coronary artery disease)    • Diabetes mellitus (CMS/HCC)    • Diabetes mellitus type I (CMS/HCC)    • GERD (gastroesophageal reflux disease)    • Hyperlipidemia    • Hypertension    • Myocardial infarction (CMS/HCC) 2011   • Vitamin D deficiency      Past Surgical History:   Procedure Laterality Date   • BLADDER SURGERY  2011    bladder mesh   • CHOLECYSTECTOMY     • COLONOSCOPY     • ENDOSCOPY     • EYE SURGERY  2018   • HYSTERECTOMY  2009     /75 (BP Location: Left arm, Patient Position: Sitting)   Pulse 61   Ht 157.5 cm (62\")   Wt 81.6 kg (180 lb)   SpO2 96%   BMI 32.92 kg/m²   Family History   Problem Relation Age of Onset   • Diabetes Mother    • Hypertension Mother    • Diabetes Father    • Hypertension Father    • Stroke Father    • Diabetes Paternal Grandmother    • Diabetes Paternal Grandfather    • Diabetes Other         aunt   • Cancer Other         brain cancer - uncle   • Cancer Other         breast cancer - aunt       Current Outpatient Medications:   •  aspirin 81 MG " tablet, Take 1 tablet by mouth Daily., Disp: , Rfl:   •  Blood Glucose Monitoring Suppl (FREESTYLE LITE) device, FREESTYLE LITE VERENICE, Disp: , Rfl:   •  cetirizine (zyrTEC) 10 MG tablet, Take 1 tablet by mouth Daily., Disp: , Rfl:   •  FLUoxetine (PROzac) 20 MG tablet, Take 1 tablet by mouth Daily., Disp: , Rfl:   •  gabapentin (NEURONTIN) 300 MG capsule, Take 300 mg by mouth 4 (Four) Times a Day., Disp: , Rfl:   •  glucagon (GLUCAGON EMERGENCY) 1 MG injection, Take As Directed., Disp: , Rfl:   •  glucose blood (FREESTYLE LITE) test strip, FREESTYLE LITE TEST STRP, Disp: , Rfl:   •  glycopyrrolate (ROBINUL) 2 MG tablet, Take 1 tablet by mouth 2 (Two) Times a Day., Disp: , Rfl:   •  hydroxychloroquine (PLAQUENIL) 200 MG tablet, Take 1 tablet by mouth Daily., Disp: 90 tablet, Rfl: 1  •  insulin glulisine (APIDRA) 100 UNIT/ML injection, USE AS DIRECTED IN INSULIN PUMP, MAXIMUM DAILY DOSE 170 UNITS PER DAY, Disp: 150 mL, Rfl: 4  •  Lancets (FREESTYLE) lancets, 5 times daily, Disp: , Rfl:   •  lisinopril (PRINIVIL,ZESTRIL) 5 MG tablet, Take 1 tablet by mouth Daily., Disp: , Rfl:   •  metoprolol tartrate (LOPRESSOR) 25 MG tablet, Take 25 mg by mouth 2 (Two) Times a Day., Disp: , Rfl: 1  •  omeprazole (priLOSEC) 40 MG capsule, Take 40 mg by mouth Daily., Disp: , Rfl: 3  •  simvastatin (ZOCOR) 40 MG tablet, 20 mg., Disp: , Rfl:   •  tiZANidine (ZANAFLEX) 4 MG tablet, TAKE 1/2-1 TAB BY MOUTH EVERY NIGHT AT BEDTIME, Disp: , Rfl: 1  •  acetaminophen-codeine (TYLENOL #3) 300-30 MG per tablet, Take 1 tablet by mouth 2 (Two) Times a Day As Needed., Disp: , Rfl: 0  Allergies   Allergen Reactions   • Ciprofloxacin Unknown (See Comments)   • Hydrocodone Unknown (See Comments)   • Macadamia Nut Oil Unknown (See Comments)   • Nitrofurantoin Unknown (See Comments)   • Nuts Unknown (See Comments)   • Sulfamethoxazole-Trimethoprim Unknown (See Comments)   • Tramadol Hcl Unknown (See Comments)     Social History     Socioeconomic History    • Marital status:      Spouse name: Peterson De La Fuente   • Number of children: 3   • Years of education: Not on file   • Highest education level: Not on file   Tobacco Use   • Smoking status: Never Smoker   • Smokeless tobacco: Never Used   Substance and Sexual Activity   • Alcohol use: No     Frequency: Never   • Drug use: No     Review of Systems   Constitution: Negative for fever and malaise/fatigue.   HENT: Negative for ear pain and nosebleeds.    Eyes: Negative for blurred vision and double vision.   Cardiovascular: Negative for chest pain, dyspnea on exertion and palpitations.   Respiratory: Negative for cough and shortness of breath.    Skin: Negative for rash.   Musculoskeletal: Negative for joint pain.   Gastrointestinal: Negative for abdominal pain, nausea and vomiting.   Neurological: Negative for focal weakness and headaches.   Psychiatric/Behavioral: Negative for depression. The patient is not nervous/anxious.    All other systems reviewed and are negative.             Objective:     Physical Exam   Constitutional: She appears well-developed and well-nourished.   HENT:   Head: Normocephalic and atraumatic.   Eyes: Conjunctivae and EOM are normal. Pupils are equal, round, and reactive to light. No scleral icterus.   Neck: Normal range of motion. Neck supple. No JVD present. Carotid bruit is not present.   Cardiovascular: Normal rate, regular rhythm, S1 normal, S2 normal, normal heart sounds and intact distal pulses. PMI is not displaced.   Pulmonary/Chest: Effort normal and breath sounds normal. She has no wheezes. She has no rales.   Abdominal: Soft. Bowel sounds are normal.   Musculoskeletal: Normal range of motion.   Neurological: She is alert. She has normal strength.   No focal deficits   Skin: Skin is warm and dry. No rash noted.   Psychiatric: She has a normal mood and affect.     Procedures    Lab Review:       Assessment:          Diagnosis Plan   1. Chronic coronary artery disease     2.  Pure hypercholesterolemia     3. Hypertension, benign     4. Type 1 diabetes mellitus with hyperglycemia (CMS/HCC)     5. Chronic renal insufficiency, stage 1            Plan:       Patient has history of coronary disease followed by stent placement in the past [place] patient has normal LV function  Patient's lipid levels are followed by primary care doctor  Patient blood pressure and heart rate are stable per  Patient's sugar levels are followed by the primary care doctor  Patient also has chronic renal insufficiency and is followed by the primary care doctor.  Continue current medicines and follow in 6 months

## 2019-10-03 ENCOUNTER — TELEPHONE (OUTPATIENT)
Dept: ENDOCRINOLOGY | Facility: CLINIC | Age: 54
End: 2019-10-03

## 2019-10-03 NOTE — TELEPHONE ENCOUNTER
Pt dropped off BG's for [employer] pump upgrade showing that pt is checking her BG's 4 times a day. Scanned into this phone note.    Dr. Peralta, can you append the 8/27/19 office note to say:    Pt has been checking her BG's for 4 times a day for the last 30 days.    Once appended, CDE to send along with signed MD written order (see BG cart).    Thank you!

## 2019-10-21 DIAGNOSIS — I10 HYPERTENSION, BENIGN: Primary | ICD-10-CM

## 2019-10-21 DIAGNOSIS — E10.65 TYPE 1 DIABETES MELLITUS WITH HYPERGLYCEMIA (HCC): ICD-10-CM

## 2019-10-21 DIAGNOSIS — E78.00 PURE HYPERCHOLESTEROLEMIA: ICD-10-CM

## 2019-10-21 NOTE — PROGRESS NOTES
Adeola, can you please fax these lab orders (FBG and c-peptide) to [facility] in [place]?  Pt already knows to fast and make sure her FBG is <200.  She is going to the lab on Thursday morning.  Thanks!

## 2019-10-23 ENCOUNTER — RESULTS ENCOUNTER (OUTPATIENT)
Dept: ENDOCRINOLOGY | Facility: CLINIC | Age: 54
End: 2019-10-23

## 2019-10-23 DIAGNOSIS — I10 HYPERTENSION, BENIGN: ICD-10-CM

## 2019-10-23 DIAGNOSIS — E10.65 TYPE 1 DIABETES MELLITUS WITH HYPERGLYCEMIA (HCC): ICD-10-CM

## 2019-10-23 DIAGNOSIS — E78.00 PURE HYPERCHOLESTEROLEMIA: ICD-10-CM

## 2019-10-31 ENCOUNTER — TELEPHONE (OUTPATIENT)
Dept: ENDOCRINOLOGY | Facility: CLINIC | Age: 54
End: 2019-10-31

## 2020-01-20 ENCOUNTER — OFFICE VISIT (OUTPATIENT)
Dept: RHEUMATOLOGY | Facility: CLINIC | Age: 55
End: 2020-01-20

## 2020-01-20 VITALS
DIASTOLIC BLOOD PRESSURE: 62 MMHG | SYSTOLIC BLOOD PRESSURE: 120 MMHG | BODY MASS INDEX: 37.17 KG/M2 | HEART RATE: 61 BPM | WEIGHT: 202 LBS | HEIGHT: 62 IN

## 2020-01-20 DIAGNOSIS — M62.81 MUSCLE WEAKNESS: ICD-10-CM

## 2020-01-20 DIAGNOSIS — M79.642 BILATERAL HAND PAIN: ICD-10-CM

## 2020-01-20 DIAGNOSIS — M65.342 TRIGGER RING FINGER OF LEFT HAND: ICD-10-CM

## 2020-01-20 DIAGNOSIS — M79.641 BILATERAL HAND PAIN: ICD-10-CM

## 2020-01-20 DIAGNOSIS — R76.8 POSITIVE ANA (ANTINUCLEAR ANTIBODY): Primary | ICD-10-CM

## 2020-01-20 DIAGNOSIS — M06.4 UNDIFFERENTIATED INFLAMMATORY POLYARTHRITIS (HCC): ICD-10-CM

## 2020-01-20 PROBLEM — K58.9 IRRITABLE BOWEL SYNDROME: Status: ACTIVE | Noted: 2020-01-20

## 2020-01-20 PROBLEM — M06.9 RHEUMATOID ARTHRITIS: Status: ACTIVE | Noted: 2019-02-13

## 2020-01-20 PROBLEM — K21.00 GASTRO-ESOPHAGEAL REFLUX DISEASE WITH ESOPHAGITIS: Status: ACTIVE | Noted: 2020-01-20

## 2020-01-20 PROBLEM — E10.9 TYPE 1 DIABETES MELLITUS (HCC): Status: ACTIVE | Noted: 2019-10-24

## 2020-01-20 PROCEDURE — 99214 OFFICE O/P EST MOD 30 MIN: CPT | Performed by: NURSE PRACTITIONER

## 2020-01-20 RX ORDER — EPINEPHRINE 0.3 MG/.3ML
INJECTION SUBCUTANEOUS
COMMUNITY

## 2020-01-20 RX ORDER — NYSTATIN 100000 U/G
CREAM TOPICAL
COMMUNITY
End: 2020-01-20

## 2020-01-20 RX ORDER — GABAPENTIN 300 MG/1
300 CAPSULE ORAL 3 TIMES DAILY
Qty: 270 CAPSULE | Refills: 0 | Status: SHIPPED | OUTPATIENT
Start: 2020-01-20

## 2020-01-20 RX ORDER — CEFDINIR 300 MG/1
CAPSULE ORAL
COMMUNITY
Start: 2020-01-17 | End: 2020-02-27

## 2020-01-20 RX ORDER — HYDROXYCHLOROQUINE SULFATE 200 MG/1
200 TABLET, FILM COATED ORAL DAILY
Qty: 90 TABLET | Refills: 0 | Status: SHIPPED | OUTPATIENT
Start: 2020-01-20 | End: 2020-04-07 | Stop reason: SDUPTHER

## 2020-01-20 NOTE — PROGRESS NOTES
"Subjective   Faye De La Fuente is a 54 y.o. female.     History of Present Illness   Pt is a pleasant 54 yr old female here for follow up today for her positive ARTHUR and undifferentiated inflammatory arthritis. Last seen Dr. Cruz in July 2019. She reports that she was told that she did have lupus & started taking plaquenil.  Labs on 1-: normal complement levels, HbA1c 8.3, CBC showed no leukopenia, anemia or thrombocytopenia, normal creatinine, ALT normal, AST 37 (14-36). CRP mildly elevated.  Being treated for UTI    She is taking plaquenil 200 mg/d and is up to date on her eye examination.   She comes in today with complaints of bilateral hand pain, mariana knee pain and muscle weakness in the upper arms. Denies joint stiffness or swelling. No muscle pain, just weakness. Has to take breaks, even folding laundry is difficult for her. Her left ring finger is \"locking up\" painful at times.  Hands will ache, feels better \"to rub her hands\" did have to see ortho in the past for her right hand due to trigger finger.  For her knees she sees pain management & reports having injections done; believes it was steroid injections, didn't help much. Was told that she did have arthritis in her knees.   She takes neurontin for her diabetic neuropathy; reports intermittent numbness and tingling in both feet. She is under the care of endocrinology for her diabetes.    ROS: denies fever/chills, no nasal or oral lesions. +dry mouth. Denies N/V/D. No CP or SOA. +hairloss. +weight gain. Energy is \"up and down\" She has diabetes and is under the care of endocrinology. Denies any features of Raynaud's. No dysphagia. Denies bloody/foamy urine. NO skin rashes or PS. The remainder of the review of systems reviewed and are (-)    US of liver 4/2019: showed fatty liver    Rheumatological history:  + ARTHUR--[facility] 12/2018 showed + ARTHUR IgG, titer 1:160 homogenous pattern  On plaquenil 200mg/d per Dr. Cruz    Other considerations: Pts mother " had RA        The following portions of the patient's history were reviewed and updated as appropriate: allergies, current medications, past family history, past medical history, past social history, past surgical history and problem list.    Review of Systems  See HPI    Objective   Physical Exam   Constitutional: She is oriented to person, place, and time. She appears well-developed and well-nourished.   HENT:   Head: Normocephalic.   Nose: Nose normal.   Eyes: Pupils are equal, round, and reactive to light. EOM are normal.   Neck: Normal range of motion.   Cardiovascular: Normal rate and regular rhythm.   Pulmonary/Chest: Effort normal. She has no wheezes.   Musculoskeletal:   Trigger finger over the left 4th digit  Mild tenderness over the right knee, no tenderness over the bilateral MCPs, PIPs or DIPs  No swelling is noted on examination    Upper ext  strength: 4/5  Lower ext strength: 4/5   Neurological: She is alert and oriented to person, place, and time.   Skin: Skin is warm and dry. No rash noted.   Psychiatric: She has a normal mood and affect.   Vitals reviewed.        Assessment/Plan   Faye was seen today for joint pain.    Diagnoses and all orders for this visit:    Positive ARTHUR (antinuclear antibody)  Comments:  Check AVISE today; pt is on plaquenil 200 mg/d  add labs    Orders:  -     AVISE Testing; Future  -     US Soft Tissue; Future  -     XR Hand 3+ View Bilateral; Future  -     Aldolase  -     CK  -     hydroxychloroquine (PLAQUENIL) 200 MG tablet; Take 1 tablet by mouth Daily.    Muscle weakness  Comments:  Check muscle enzymes today  weakness in uppert ext only    Bilateral hand pain  Comments:  Check hand x-ray & MSK US  Orders:  -     XR Hand 3+ View Bilateral; Future    Trigger ring finger of left hand  Comments:  Discussed seeing ortho or [facility] for trigger finger injection    Undifferentiated inflammatory polyarthritis (CMS/HCC)  Comments:  Check AVISE & add labs  on plaquenil 200  mg/d--pt to continue  Hand x-ray + MSK US  Orders:  -     hydroxychloroquine (PLAQUENIL) 200 MG tablet; Take 1 tablet by mouth Daily.    Other orders  -     gabapentin (NEURONTIN) 300 MG capsule; Take 1 capsule by mouth 3 (Three) Times a Day.        Patient Instructions   Check labs today to include AVISE, muscle enzymes  Hand x-ray today + MSK US of the hands  For the left 4th trigger finger advised pt to follow up w/ [facility]  Continue plaquenil 200 mg/d  Discussed the importance of eye examination with plaquenil use. Pt verbalizes understanding.   She is under the care of pain management for her bilateral knees-- discussed injections & she will follow up with them  RTO in 2-3 months  Will notify pt w/ results.

## 2020-01-20 NOTE — PATIENT INSTRUCTIONS
Check labs today to include AVISE, muscle enzymes  Hand x-ray today + MSK US of the hands  For the left 4th trigger finger advised pt to follow up w/ [facility]  Continue plaquenil 200 mg/d  Discussed the importance of eye examination with plaquenil use. Pt verbalizes understanding.   She is under the care of pain management for her bilateral knees-- discussed injections & she will follow up with them  RTO in 2-3 months  Will notify pt w/ results.

## 2020-01-22 ENCOUNTER — OFFICE VISIT (OUTPATIENT)
Dept: ENDOCRINOLOGY | Facility: CLINIC | Age: 55
End: 2020-01-22

## 2020-01-22 DIAGNOSIS — R76.8 POSITIVE ANA (ANTINUCLEAR ANTIBODY): ICD-10-CM

## 2020-01-22 DIAGNOSIS — E10.65 TYPE 1 DIABETES MELLITUS WITH HYPERGLYCEMIA (HCC): Primary | ICD-10-CM

## 2020-01-22 NOTE — PROGRESS NOTES
Pump Training with 670G no sensor. With  Seen 8:15-10:15  No charge  Reimbursement from pump company

## 2020-01-23 ENCOUNTER — APPOINTMENT (OUTPATIENT)
Dept: LAB | Facility: HOSPITAL | Age: 55
End: 2020-01-23

## 2020-01-23 ENCOUNTER — LAB REQUISITION (OUTPATIENT)
Dept: LAB | Facility: HOSPITAL | Age: 55
End: 2020-01-23

## 2020-01-23 DIAGNOSIS — D89.89 OTHER SPECIFIED DISORDERS INVOLVING THE IMMUNE MECHANISM, NOT ELSEWHERE CLASSIFIED (HCC): ICD-10-CM

## 2020-01-23 LAB — CK SERPL-CCNC: 85 U/L (ref 20–180)

## 2020-01-23 PROCEDURE — 82550 ASSAY OF CK (CPK): CPT | Performed by: NURSE PRACTITIONER

## 2020-01-23 PROCEDURE — 36415 COLL VENOUS BLD VENIPUNCTURE: CPT | Performed by: NURSE PRACTITIONER

## 2020-01-23 PROCEDURE — 82085 ASSAY OF ALDOLASE: CPT | Performed by: NURSE PRACTITIONER

## 2020-01-24 LAB — ALDOLASE SERPL-CCNC: 4.2 U/L (ref 3.3–10.3)

## 2020-02-27 ENCOUNTER — OFFICE VISIT (OUTPATIENT)
Dept: ENDOCRINOLOGY | Facility: CLINIC | Age: 55
End: 2020-02-27

## 2020-02-27 VITALS
WEIGHT: 206 LBS | DIASTOLIC BLOOD PRESSURE: 72 MMHG | SYSTOLIC BLOOD PRESSURE: 110 MMHG | BODY MASS INDEX: 37.91 KG/M2 | HEART RATE: 65 BPM | HEIGHT: 62 IN | OXYGEN SATURATION: 99 %

## 2020-02-27 DIAGNOSIS — E78.2 MIXED HYPERLIPIDEMIA: ICD-10-CM

## 2020-02-27 DIAGNOSIS — E10.65 TYPE 1 DIABETES MELLITUS WITH HYPERGLYCEMIA (HCC): Primary | ICD-10-CM

## 2020-02-27 DIAGNOSIS — I10 HYPERTENSION, BENIGN: ICD-10-CM

## 2020-02-27 DIAGNOSIS — E11.42 DIABETIC PERIPHERAL NEUROPATHY (HCC): ICD-10-CM

## 2020-02-27 PROCEDURE — 99214 OFFICE O/P EST MOD 30 MIN: CPT | Performed by: INTERNAL MEDICINE

## 2020-02-27 RX ORDER — ACETAMINOPHEN 10 MG/ML
INJECTION, SOLUTION INTRAVENOUS
COMMUNITY
End: 2020-02-27

## 2020-02-27 NOTE — PROGRESS NOTES
Endocrine Progress Note Outpatient     Patient Care Team:  Nalini Quezada APRN as PCP - General    Chief Complaint: Follow-up type 1 diabetes    HPI: 54-year-old female with history of type 1 diabetes since 1997, also history of hypertension, hyperlipidemia and diabetic neuropathy is here for follow-up.    For type 1 diabetes she is currently on Medtronic 670 G insulin pump.  Her current insulin pump settings are as follows basal rate midnight 2.45 units/h, 4 AM 2.85 units/h, 6 AM 2.95 units/h, 10 AM 2.75 units/h.  Insulin carb ratio midnight 1 unit for each 4.3 g of carbohydrate and 4 PM is 1 unit for each 3.7 g of carbohydrate, insulin sensitive factor is 1 unit for each 10, with a target blood sugar of 100-1 35.  Active insulin is 4 hours.  Unfortunately her insurance will not cover sensors for this pump.  She did bring in blood sugar records for review and she is having low blood sugars anywhere between 3 PM to midnight.  She has not required any assistance or hospitalization or emergency room visits since last seen for high or low blood sugar.    Hypertension: Well-controlled  Hyperlipidemia: On simvastatin and fenofibrate  Diabetic neuropathy: She has right foot drop    Past Medical History:   Diagnosis Date   • CAD (coronary artery disease)    • Diabetes mellitus (CMS/[facility])    • Diabetes mellitus type I (CMS/[facility])    • GERD (gastroesophageal reflux disease)    • Hyperlipidemia    • Hypertension    • Myocardial infarction (CMS/HCC) 2011   • Vitamin D deficiency        Social History     Socioeconomic History   • Marital status:      Spouse name: Peterson De La Fuente   • Number of children: 3   • Years of education: Not on file   • Highest education level: Not on file   Tobacco Use   • Smoking status: Never Smoker   • Smokeless tobacco: Never Used   Substance and Sexual Activity   • Alcohol use: No     Frequency: Never   • Drug use: No       Family History   Problem Relation Age of Onset   • Diabetes  Mother    • Hypertension Mother    • Diabetes Father    • Hypertension Father    • Stroke Father    • Diabetes Paternal Grandmother    • Diabetes Paternal Grandfather    • Diabetes Other         aunt   • Cancer Other         brain cancer - uncle   • Cancer Other         breast cancer - aunt       Allergies   Allergen Reactions   • Ciprofloxacin Hives   • Hydrocodone Hives   • Macadamia Nut Oil Anaphylaxis   • Nitrofurantoin Rash   • Nuts Anaphylaxis   • Sulfamethoxazole-Trimethoprim Rash   • Tramadol Hcl Hives       ROS:   Constitutional:  Admit fatigue, tiredness.    Eyes:  Denies change in visual acuity   HENT:  Denies nasal congestion or sore throat   Respiratory: denies cough, shortness of breath.   Cardiovascular:  denies chest pain, edema   GI:  Denies abdominal pain, nausea, vomiting.   Musculoskeletal:  Denies back pain or joint pain   Integument:  Denies dry skin and rash   Neurologic:  Denies headache, focal weakness or sensory changes   Endocrine:  Denies polyuria or polydipsia   Psychiatric:  Denies depression or anxiety      Vitals:    02/27/20 0956   BP: 110/72   Pulse: 65   SpO2: 99%       Physical Exam:  GEN: NAD, conversant  EYES: EOMI, PERRL, no conjunctival erythema  NECK: no thyromegaly, full ROM   CV: RRR, no murmurs/rubs/gallops, no peripheral edema  LUNG: CTAB, no wheezes/rales/ronchi  SKIN: no rashes, no acanthosis  MSK: no deformities, full ROM of all extremities  NEURO: no tremors, DTR normal  PSYCH: AOX3, appropriate mood, affect normal  Feet: Has callus on left sole, no ulcer seen    Results Review:     I reviewed the patient's new clinical results.      Lab Results   Component Value Date    GLUCOSE 261 (H) 12/06/2018    BUN 21 (H) 12/06/2018    CREATININE 1.2 (H) 12/06/2018    BCR 17.5 12/06/2018    K 4.5 12/06/2018    CO2 28 12/06/2018    CALCIUM 9.6 12/06/2018    ALBUMIN 4.0 12/06/2018    ALBUMIN 3.8 12/06/2018    LABIL2 1.2 12/06/2018    AST 62 (H) 12/06/2018    ALT 32 12/06/2018      Lab Results   Component Value Date    TSH 3.38 12/06/2018     Labs from January 10, 2020 showed an A1c of 8.3%, sodium 140, potassium 5.0, chloride 105, CO 25, glucose 229, BUN 23, creatinine 1.17, LDL 60, triglycerides 127 and urine microalbumin creatinine ratio was 10.2    Labs from August 9, 2019 showed an A1c of 7.4%, BUN 19, creatinine 1.4, sodium 139, potassium 4.9, chloride 101, 0 25, glucose 173.    Medication Review: Reviewed.       Current Outpatient Medications:   •  aspirin 81 MG tablet, Take 1 tablet by mouth Daily., Disp: , Rfl:   •  Blood Glucose Monitoring Suppl (FREESTYLE LITE) device, FREESTYLE LITE VERENICE, Disp: , Rfl:   •  cetirizine (zyrTEC) 10 MG tablet, Take 1 tablet by mouth Daily., Disp: , Rfl:   •  EPINEPHrine (EPIPEN) 0.3 MG/0.3ML solution auto-injector injection, epinephrine 0.3 mg/0.3 mL injection, auto-injector, Disp: , Rfl:   •  FLUoxetine (PROzac) 20 MG tablet, Take 1 tablet by mouth Daily., Disp: , Rfl:   •  gabapentin (NEURONTIN) 300 MG capsule, Take 1 capsule by mouth 3 (Three) Times a Day., Disp: 270 capsule, Rfl: 0  •  glucagon (GLUCAGON EMERGENCY) 1 MG injection, Take As Directed., Disp: , Rfl:   •  glucose blood (FREESTYLE LITE) test strip, FREESTYLE LITE TEST STRP, Disp: , Rfl:   •  glycopyrrolate (ROBINUL) 2 MG tablet, Take 1 tablet by mouth 2 (Two) Times a Day., Disp: , Rfl:   •  hydroxychloroquine (PLAQUENIL) 200 MG tablet, Take 1 tablet by mouth Daily., Disp: 90 tablet, Rfl: 0  •  insulin glulisine (APIDRA) 100 UNIT/ML injection, USE AS DIRECTED IN INSULIN PUMP, MAXIMUM DAILY DOSE 170 UNITS PER DAY, Disp: 150 mL, Rfl: 4  •  Lancets (FREESTYLE) lancets, 5 times daily, Disp: , Rfl:   •  lisinopril (PRINIVIL,ZESTRIL) 5 MG tablet, Take 1 tablet by mouth Daily., Disp: , Rfl:   •  metoprolol tartrate (LOPRESSOR) 25 MG tablet, Take 25 mg by mouth 2 (Two) Times a Day., Disp: , Rfl: 1  •  omeprazole (priLOSEC) 40 MG capsule, Take 40 mg by mouth Daily., Disp: , Rfl: 3  •   simvastatin (ZOCOR) 40 MG tablet, 40 mg., Disp: , Rfl:   •  tiZANidine (ZANAFLEX) 4 MG tablet, TAKE 1/2-1 TAB BY MOUTH EVERY NIGHT AT BEDTIME, Disp: , Rfl: 1      Assessment/Plan   1.  Diabetes mellitus type 1: Uncontrolled with high A1c of 8.3% and low blood sugars.  I have changed her basal rates as follows midnight 2.45 units/h, 4 AM 2.85 units/h, 6 AM 2.95 units/h, 10 AM 2.75 units/h, 3 PM 2.65 units/h.  She is advised to always keep glucose source with her in case of low blood sugar and have Glucagon Emergency Kit ready and make sure sugars about 100 before she drives.  She is also advised to get regular eye exam and flu vaccine.      2.  Hypertension: Well-controlled, continue current medication  3.  Hyperlipidemia: On simvastatin and fenofibrate, will continue current medications and follow lipid panel.  4.  Diabetic neuropathy: Stable.           Marely Peralta MD FACE.

## 2020-02-27 NOTE — PATIENT INSTRUCTIONS
Please keep glucose source with you all the time in case of low blood sugar and have Glucagon Emergency Kit ready  Get regular eye exam and flu vaccine  Follow-up in 6 months with labs

## 2020-04-07 ENCOUNTER — OFFICE VISIT (OUTPATIENT)
Dept: RHEUMATOLOGY | Facility: CLINIC | Age: 55
End: 2020-04-07

## 2020-04-07 DIAGNOSIS — E11.610 TYPE 2 DIABETES MELLITUS WITH DIABETIC NEUROPATHIC ARTHROPATHY, WITHOUT LONG-TERM CURRENT USE OF INSULIN (HCC): ICD-10-CM

## 2020-04-07 DIAGNOSIS — R76.8 POSITIVE ANA (ANTINUCLEAR ANTIBODY): ICD-10-CM

## 2020-04-07 DIAGNOSIS — M06.4 UNDIFFERENTIATED INFLAMMATORY POLYARTHRITIS (HCC): ICD-10-CM

## 2020-04-07 DIAGNOSIS — M72.0 DUPUYTREN'S CONTRACTURE: ICD-10-CM

## 2020-04-07 DIAGNOSIS — E55.9 VITAMIN D DEFICIENCY, UNSPECIFIED: ICD-10-CM

## 2020-04-07 DIAGNOSIS — R76.8 POSITIVE ANA (ANTINUCLEAR ANTIBODY): Primary | ICD-10-CM

## 2020-04-07 PROBLEM — M65.30 TRIGGERING OF DIGIT: Status: ACTIVE | Noted: 2020-04-07

## 2020-04-07 PROBLEM — R55 SYNCOPE: Status: ACTIVE | Noted: 2020-04-07

## 2020-04-07 PROBLEM — Z79.4 TYPE 2 DIABETES MELLITUS TREATED WITH INSULIN: Status: ACTIVE | Noted: 2020-04-07

## 2020-04-07 PROBLEM — E11.9 TYPE 2 DIABETES MELLITUS TREATED WITH INSULIN: Status: ACTIVE | Noted: 2020-04-07

## 2020-04-07 PROCEDURE — 99213 OFFICE O/P EST LOW 20 MIN: CPT | Performed by: NURSE PRACTITIONER

## 2020-04-07 RX ORDER — ACETAMINOPHEN AND CODEINE PHOSPHATE 300; 30 MG/1; MG/1
1 TABLET ORAL 2 TIMES DAILY PRN
COMMUNITY
Start: 2020-03-26 | End: 2021-09-28

## 2020-04-07 RX ORDER — HYDROXYCHLOROQUINE SULFATE 200 MG/1
200 TABLET, FILM COATED ORAL DAILY
Qty: 90 TABLET | Refills: 1 | Status: SHIPPED | OUTPATIENT
Start: 2020-04-07

## 2020-04-07 NOTE — PATIENT INSTRUCTIONS
Pt is stable with current therapy and will continue the plaquenil. Discussed her labs, x-ray & US  She may follow up with ortho as discussed  Labs in 4-6 wks  Discussed the importance of eye examination with plaquenil use. Pt verbalizes understanding.   RTO in 5-6 months or sooner if needed

## 2020-04-07 NOTE — PROGRESS NOTES
"Subjective   Faye De La Fuente is a 54 y.o. female.     History of Present Illness     TELEPHONE VISIT    This visit has been rescheduled as a phone visit to comply with patient safety concerns in accordance with CDC recommendations. Total time of discussion was 20 minutes.    Pt is a pleasant 54 yr old female with +ARTHUR and undifferentiated inflammatory arthritis. Last seen in January 2020. At the time of her last office visit an AVISE was ordered in addition to hand x-ray and US due to her hand pain. She was also having some muscle weakness in both arms. Her x-ray showed no erosive findings and joint spaces are well maintained in both hands. Her MSK US showed no active inflammation however did show palmar fibromatosis (Dupuytren's contracture) over the palmar aspect of the right hand and pt was referred to see ortho. She reports that she has not seen ortho yet, her symptoms have calmed down some and at this point pretty minimal. Her AVISE was overall negative, therapeutic plaquenil levels and normal muscle enzymes.    Labs on 1-: normal complement levels, HbA1c 8.3, CBC showed no leukopenia, anemia or thrombocytopenia, normal creatinine, ALT normal, AST 37 (14-36). CRP mildly elevated; pt was being terated for UTI    She is taking plaquenil 200 mg/d and is up to date on her eye examination. She reports that overall she is doing good. She will have stiffness in the morning that lasts about 10 minutes. She fractured her left 3rd toe and is currently in a boot. She has some pain with the fracture otherwise she is doing well. Denies any joint swelling.  She recently had injection per ortho to the right knee that has helped her significantly.  She takes neurontin for her diabetic neuropathy; reports intermittent numbness and tingling in both feet.      ROS: denies fever/chills, no nasal or oral lesions. +dry mouth. Denies N/V/D. No CP or SOA. +hairloss. +weight gain. Energy is \"up and down\" She has diabetes " and is under the care of endocrinology. Denies any features of Raynaud's. No dysphagia. Denies bloody/foamy urine. NO skin rashes or PS. The remainder of the review of systems reviewed and are (-)     US of liver 4/2019: showed fatty liver     Rheumatological history:  + ARTHUR--Avise 12/2018 showed + ARTHUR IgG, titer 1:160 homogenous pattern  On plaquenil 200mg/d per Dr. Cruz     Other considerations: Pts mother had RA          The following portions of the patient's history were reviewed and updated as appropriate: allergies, current medications, past family history, past medical history, past social history, past surgical history and problem list.    Review of Systems  See HPI    Objective   Physical Exam   Constitutional:   TELEPHONE VISIT         Assessment/Plan   Faye was seen today for joint pain.    Diagnoses and all orders for this visit:    Positive ARTHUR (antinuclear antibody)  Comments:  Stable w/ current therapy  Cintinue plaquenil  Future labs  Orders:  -     CBC & Differential; Future  -     Comprehensive Metabolic Panel; Future  -     C-reactive Protein; Future  -     Cyclic Citrul Peptide Antibody, IgG / IgA; Future  -     Rheumatoid Factor; Future  -     Sedimentation Rate; Future  -     Vitamin D 25 Hydroxy; Future  -     C3 Complement; Future  -     C4 Complement; Future  -     Urinalysis With Culture If Indicated -; Future  -     ARTHUR by IFA, Reflex 9-biomarkers profile; Future    Dupuytren's contracture  Comments:  Discussed her following up with ortho as needed    Undifferentiated inflammatory polyarthritis (CMS/HCC)  Comments:  Stable at this time; continue plaquenil  Future orders  Orders:  -     CBC & Differential; Future  -     Comprehensive Metabolic Panel; Future  -     C-reactive Protein; Future  -     Cyclic Citrul Peptide Antibody, IgG / IgA; Future  -     Rheumatoid Factor; Future  -     Sedimentation Rate; Future  -     Vitamin D 25 Hydroxy; Future  -     C3 Complement; Future  -     C4  Complement; Future  -     Urinalysis With Culture If Indicated -; Future  -     ARTHUR by IFA, Reflex 9-biomarkers profile; Future    Type 2 diabetes mellitus with diabetic neuropathic arthropathy, without long-term current use of insulin (CMS/[facility])  Comments:  She is under the care of endocrinologist    Vitamin D deficiency, unspecified   Comments:  Future orders  Orders:  -     Vitamin D 25 Hydroxy; Future        Patient Instructions   Pt is stable with current therapy and will continue the plaquenil. Discussed her labs, x-ray & US  She may follow up with ortho as discussed  Labs in 4-6 wks  Discussed the importance of eye examination with plaquenil use. Pt verbalizes understanding.   RTO in 5-6 months or sooner if needed

## 2020-06-25 ENCOUNTER — TELEPHONE (OUTPATIENT)
Dept: ENDOCRINOLOGY | Facility: CLINIC | Age: 55
End: 2020-06-25

## 2020-07-21 ENCOUNTER — TELEPHONE (OUTPATIENT)
Dept: ENDOCRINOLOGY | Facility: CLINIC | Age: 55
End: 2020-07-21

## 2020-09-01 ENCOUNTER — OFFICE VISIT (OUTPATIENT)
Dept: CARDIOLOGY | Facility: CLINIC | Age: 55
End: 2020-09-01

## 2020-09-01 VITALS
DIASTOLIC BLOOD PRESSURE: 55 MMHG | OXYGEN SATURATION: 95 % | BODY MASS INDEX: 37.91 KG/M2 | SYSTOLIC BLOOD PRESSURE: 122 MMHG | WEIGHT: 206 LBS | HEART RATE: 61 BPM | HEIGHT: 62 IN

## 2020-09-01 DIAGNOSIS — N18.30 CHRONIC RENAL INSUFFICIENCY, STAGE III (MODERATE) (HCC): ICD-10-CM

## 2020-09-01 DIAGNOSIS — E78.2 MIXED HYPERLIPIDEMIA: ICD-10-CM

## 2020-09-01 DIAGNOSIS — I10 HYPERTENSION, BENIGN: ICD-10-CM

## 2020-09-01 DIAGNOSIS — E11.9 TYPE 2 DIABETES MELLITUS TREATED WITH INSULIN (HCC): ICD-10-CM

## 2020-09-01 DIAGNOSIS — Z79.4 TYPE 2 DIABETES MELLITUS TREATED WITH INSULIN (HCC): ICD-10-CM

## 2020-09-01 DIAGNOSIS — I25.10 CHRONIC CORONARY ARTERY DISEASE: Primary | ICD-10-CM

## 2020-09-01 PROCEDURE — 99214 OFFICE O/P EST MOD 30 MIN: CPT | Performed by: INTERNAL MEDICINE

## 2020-09-01 NOTE — PROGRESS NOTES
"    Subjective:     Encounter Date:09/01/2020      Patient ID: Faye De La Fuente is a 54 y.o. female.    Chief Complaint: Coronary Artery Disease  History of Present Illness 54-year-old white female with history of coronary disease hypertension hyperlipidemia diabetes chronic renal insufficiency presents to my office for follow-up.  Patient is currently stable without any symptoms of chest pain or shortness of breath at rest or exertion.  No complains any PND orthopnea.  No palpitation dizziness syncope or swelling of the feet.  Patient has been taking her medicines regularly.  Patient does not smoke.  She is trying to exercise regularly she follows a good diet.    The following portions of the patient's history were reviewed and updated as appropriate: allergies, current medications, past family history, past medical history, past social history, past surgical history and problem list.  Past Medical History:   Diagnosis Date   • CAD (coronary artery disease)    • Diabetes mellitus (CMS/HCC)    • Diabetes mellitus type I (CMS/HCC)    • GERD (gastroesophageal reflux disease)    • Hyperlipidemia    • Hypertension    • Myocardial infarction (CMS/HCC) 2011   • Vitamin D deficiency      Past Surgical History:   Procedure Laterality Date   • BLADDER SURGERY  2011    bladder mesh   • CHOLECYSTECTOMY     • COLONOSCOPY     • ENDOSCOPY     • EYE SURGERY  2018   • HYSTERECTOMY  2009     /55 (BP Location: Left arm, Patient Position: Sitting, Cuff Size: Adult)   Pulse 61   Ht 157.5 cm (62\")   Wt 93.4 kg (206 lb)   SpO2 95%   BMI 37.68 kg/m²   Family History   Problem Relation Age of Onset   • Diabetes Mother    • Hypertension Mother    • Diabetes Father    • Hypertension Father    • Stroke Father    • Diabetes Paternal Grandmother    • Diabetes Paternal Grandfather    • Diabetes Other         aunt   • Cancer Other         brain cancer - uncle   • Cancer Other         breast cancer - aunt       Current Outpatient " Medications:   •  acetaminophen-codeine (TYLENOL #3) 300-30 MG per tablet, Take 1 tablet by mouth 2 (Two) Times a Day As Needed., Disp: , Rfl:   •  aspirin 81 MG tablet, Take 1 tablet by mouth Daily., Disp: , Rfl:   •  Blood Glucose Monitoring Suppl (FREESTYLE LITE) device, FREESTYLE LITE VERENICE, Disp: , Rfl:   •  cetirizine (zyrTEC) 10 MG tablet, Take 1 tablet by mouth Daily., Disp: , Rfl:   •  EPINEPHrine (EPIPEN) 0.3 MG/0.3ML solution auto-injector injection, epinephrine 0.3 mg/0.3 mL injection, auto-injector, Disp: , Rfl:   •  FLUoxetine (PROzac) 20 MG tablet, Take 1 tablet by mouth Daily., Disp: , Rfl:   •  gabapentin (NEURONTIN) 300 MG capsule, Take 1 capsule by mouth 3 (Three) Times a Day., Disp: 270 capsule, Rfl: 0  •  glucagon (GLUCAGON EMERGENCY) 1 MG injection, Take As Directed., Disp: , Rfl:   •  glucose blood (Contour Next Test) test strip, Check blood sugars 4 times per day DX E10.65, Disp: 400 each, Rfl: 3  •  glycopyrrolate (ROBINUL) 2 MG tablet, Take 1 tablet by mouth 2 (Two) Times a Day., Disp: , Rfl:   •  hydroxychloroquine (PLAQUENIL) 200 MG tablet, Take 1 tablet by mouth Daily., Disp: 90 tablet, Rfl: 1  •  insulin glulisine (Apidra) 100 UNIT/ML injection, USE AS DIRECTED IN INSULIN PUMP, MAXIMUM DAILY DOSE 170 UNITS PER DAY DX E10.65, Disp: 150 mL, Rfl: 4  •  Lancets (FREESTYLE) lancets, 5 times daily, Disp: , Rfl:   •  lisinopril (PRINIVIL,ZESTRIL) 5 MG tablet, Take 1 tablet by mouth Daily., Disp: , Rfl:   •  metoprolol tartrate (LOPRESSOR) 25 MG tablet, TAKE 1 TABLET BY MOUTH TWICE A DAY, Disp: 180 tablet, Rfl: 0  •  omeprazole (priLOSEC) 40 MG capsule, Take 40 mg by mouth Daily., Disp: , Rfl: 3  •  simvastatin (ZOCOR) 40 MG tablet, 20 mg., Disp: , Rfl:   •  tiZANidine (ZANAFLEX) 4 MG tablet, TAKE 1/2-1 TAB BY MOUTH EVERY NIGHT AT BEDTIME, Disp: , Rfl: 1  Allergies   Allergen Reactions   • Ciprofloxacin Hives   • Hydrocodone Hives   • Macadamia Nut Oil Anaphylaxis   • Nitrofurantoin Rash   •  Nuts Anaphylaxis   • Sulfamethoxazole-Trimethoprim Rash   • Tramadol Hcl Hives     Social History     Socioeconomic History   • Marital status:      Spouse name: Peterson De La Fuente   • Number of children: 3   • Years of education: Not on file   • Highest education level: Not on file   Tobacco Use   • Smoking status: Never Smoker   • Smokeless tobacco: Never Used   Substance and Sexual Activity   • Alcohol use: No     Frequency: Never   • Drug use: No     Review of Systems   Constitution: Negative for fever and malaise/fatigue.   HENT: Negative for congestion and hearing loss.    Eyes: Negative for double vision and visual disturbance.   Cardiovascular: Negative for chest pain, claudication, dyspnea on exertion, leg swelling and syncope.   Respiratory: Negative for cough and shortness of breath.    Endocrine: Negative for cold intolerance.   Skin: Negative for color change and rash.   Musculoskeletal: Negative for arthritis and joint pain.   Gastrointestinal: Negative for abdominal pain and heartburn.   Genitourinary: Negative for hematuria.   Neurological: Negative for excessive daytime sleepiness and dizziness.   Psychiatric/Behavioral: Negative for depression. The patient is not nervous/anxious.    All other systems reviewed and are negative.             Objective:     Physical Exam   Constitutional: She appears well-developed and well-nourished.   HENT:   Head: Normocephalic and atraumatic.   Eyes: Pupils are equal, round, and reactive to light. Conjunctivae and EOM are normal. No scleral icterus.   Neck: Normal range of motion. Neck supple. No JVD present. Carotid bruit is not present.   Cardiovascular: Normal rate, regular rhythm, S1 normal, S2 normal, normal heart sounds and intact distal pulses. PMI is not displaced.   Pulmonary/Chest: Effort normal and breath sounds normal. She has no wheezes. She has no rales.   Abdominal: Soft. Bowel sounds are normal.   Musculoskeletal: Normal range of motion.    Neurological: She is alert. She has normal strength.   No focal deficits   Skin: Skin is warm and dry. No rash noted.   Psychiatric: She has a normal mood and affect.     Procedures    Lab Review:       Assessment:          Diagnosis Plan   1. Chronic coronary artery disease     2. Mixed hyperlipidemia     3. Hypertension, benign     4. Chronic renal insufficiency, stage III (moderate) (CMS/[facility])     5. Type 2 diabetes mellitus treated with insulin (CMS/[facility])            Plan:       Patient has nonobstructive coronary disease with normal function is currently stable on medical therapy  Patient blood pressure and heart rate are stable  Patient lipid levels are followed by the primary care doctor  Patient has diabetes and is followed by endocrinologist for her sugar levels  Patient has chronic renal insufficiency and is followed by nephrologist also.  Continue current medicines and follow in 6 months

## 2020-09-04 ENCOUNTER — TELEPHONE (OUTPATIENT)
Dept: ENDOCRINOLOGY | Facility: CLINIC | Age: 55
End: 2020-09-04

## 2021-01-26 ENCOUNTER — OFFICE VISIT (OUTPATIENT)
Dept: ENDOCRINOLOGY | Facility: CLINIC | Age: 56
End: 2021-01-26

## 2021-01-26 VITALS
HEIGHT: 62 IN | SYSTOLIC BLOOD PRESSURE: 130 MMHG | TEMPERATURE: 97.7 F | WEIGHT: 204 LBS | DIASTOLIC BLOOD PRESSURE: 65 MMHG | BODY MASS INDEX: 37.54 KG/M2 | OXYGEN SATURATION: 95 % | HEART RATE: 66 BPM

## 2021-01-26 DIAGNOSIS — I10 HYPERTENSION, BENIGN: ICD-10-CM

## 2021-01-26 DIAGNOSIS — E11.42 DIABETIC PERIPHERAL NEUROPATHY (HCC): ICD-10-CM

## 2021-01-26 DIAGNOSIS — E78.2 MIXED HYPERLIPIDEMIA: ICD-10-CM

## 2021-01-26 DIAGNOSIS — E10.65 TYPE 1 DIABETES MELLITUS WITH HYPERGLYCEMIA (HCC): Primary | ICD-10-CM

## 2021-01-26 LAB — GLUCOSE BLDC GLUCOMTR-MCNC: 209 MG/DL (ref 70–105)

## 2021-01-26 PROCEDURE — 82962 GLUCOSE BLOOD TEST: CPT | Performed by: INTERNAL MEDICINE

## 2021-01-26 PROCEDURE — 99214 OFFICE O/P EST MOD 30 MIN: CPT | Performed by: INTERNAL MEDICINE

## 2021-01-26 RX ORDER — PROCHLORPERAZINE 25 MG/1
SUPPOSITORY RECTAL
Qty: 2 EACH | Refills: 6 | Status: SHIPPED | OUTPATIENT
Start: 2021-01-26 | End: 2021-07-27 | Stop reason: SDUPTHER

## 2021-01-26 RX ORDER — PROCHLORPERAZINE 25 MG/1
1 SUPPOSITORY RECTAL DAILY
Qty: 1 EACH | Refills: 0 | Status: SHIPPED | OUTPATIENT
Start: 2021-01-26 | End: 2021-07-27 | Stop reason: SDUPTHER

## 2021-01-26 RX ORDER — VANCOMYCIN HYDROCHLORIDE 125 MG/1
CAPSULE ORAL
COMMUNITY
Start: 2020-10-28 | End: 2021-01-26

## 2021-01-26 RX ORDER — SIMVASTATIN 20 MG
20 TABLET ORAL DAILY
COMMUNITY
Start: 2020-12-03 | End: 2021-01-26

## 2021-01-26 NOTE — PATIENT INSTRUCTIONS
Continue current management  Please consider restarting Dexcom continuous glucose monitoring system  Always keep glucose source in case of low blood sugar  Annual eye exam and flu vaccine  Labs before follow-up.

## 2021-01-26 NOTE — PROGRESS NOTES
Endocrine Progress Note Outpatient     Patient Care Team:  Nalini Abarca APRN as PCP - General    Chief Complaint: Follow-up type 1 diabetes    HPI: 55-year-old female with history of type 1 diabetes since 1997, also history of hypertension, hyperlipidemia and diabetic neuropathy is here for follow-up.    For type 1 diabetes she is currently on Medtronic 670 G insulin pump.  Her current insulin pump settings are as follows basal rate midnight 2.45 units/h, 4 AM 2.85 units/h, 6 AM 2.95 units/h, 10 AM 2.75 units/h, 3 PM is 2.65 units/h.  Insulin carb ratio midnight 1 unit for each 4.3 g of carbohydrate and 4 PM is 1 unit for each 3.7 g of carbohydrate, insulin sensitive factor is 1 unit for each 10, with a target blood sugar of 100-1 35.  Active insulin is 4 hours.  She does have some blood sugar records which is fair.  She has not required any assistance or hospitalization or emergency room visits since last seen for high or low blood sugar.    Hypertension: Well-controlled    Hyperlipidemia: On simvastatin and fenofibrate    Diabetic neuropathy: She has right foot drop    Past Medical History:   Diagnosis Date   • CAD (coronary artery disease)    • Diabetes mellitus (CMS/[facility])    • Diabetes mellitus type I (CMS/[facility])    • GERD (gastroesophageal reflux disease)    • Hyperlipidemia    • Hypertension    • Myocardial infarction (CMS/[facility]) 2011   • Vitamin D deficiency        Social History     Socioeconomic History   • Marital status:      Spouse name: Peterson De La Fuente   • Number of children: 3   • Years of education: Not on file   • Highest education level: Not on file   Tobacco Use   • Smoking status: Never Smoker   • Smokeless tobacco: Never Used   Substance and Sexual Activity   • Alcohol use: No     Frequency: Never   • Drug use: No   • Sexual activity: Defer       Family History   Problem Relation Age of Onset   • Diabetes Mother    • Hypertension Mother    • Diabetes Father    • Hypertension Father    •  Stroke Father    • Diabetes Paternal Grandmother    • Diabetes Paternal Grandfather    • Diabetes Other         aunt   • Cancer Other         brain cancer - uncle   • Cancer Other         breast cancer - aunt       Allergies   Allergen Reactions   • Ciprofloxacin Hives   • Hydrocodone Hives   • Macadamia Nut Oil Anaphylaxis   • Nitrofurantoin Rash   • Nuts Anaphylaxis   • Sulfamethoxazole-Trimethoprim Rash   • Tramadol Hcl Hives       ROS:   Constitutional:  Admit fatigue, tiredness.    Eyes:  Denies change in visual acuity   HENT:  Denies nasal congestion or sore throat   Respiratory: denies cough, shortness of breath.   Cardiovascular:  denies chest pain, edema   GI:  Denies abdominal pain, nausea, vomiting.   Musculoskeletal:  Denies back pain or joint pain   Integument:  Denies dry skin and rash   Neurologic:  Denies headache, focal weakness or sensory changes   Endocrine:  Denies polyuria or polydipsia   Psychiatric:  Denies depression or anxiety      Vitals:    01/26/21 1405   BP: 130/65   Pulse: 66   Temp: 97.7 °F (36.5 °C)   SpO2: 95%       Physical Exam:  GEN: NAD, conversant  EYES: EOMI, PERRL, no conjunctival erythema  NECK: no thyromegaly, full ROM   CV: RRR, no murmurs/rubs/gallops, no peripheral edema  LUNG: CTAB, no wheezes/rales/ronchi  SKIN: no rashes, no acanthosis  MSK: no deformities, full ROM of all extremities  NEURO: no tremors, DTR normal  PSYCH: AOX3, appropriate mood, affect normal  Feet: Has callus on left sole, no ulcer seen    Results Review:     I reviewed the patient's new clinical results.      Lab Results   Component Value Date    GLUCOSE 261 (H) 12/06/2018    BUN 21 (H) 12/06/2018    CREATININE 1.2 (H) 12/06/2018    BCR 17.5 12/06/2018    K 4.5 12/06/2018    CO2 28 12/06/2018    CALCIUM 9.6 12/06/2018    ALBUMIN 4.0 12/06/2018    ALBUMIN 3.8 12/06/2018    LABIL2 1.2 12/06/2018    AST 62 (H) 12/06/2018    ALT 32 12/06/2018     Lab Results   Component Value Date    TSH 3.38 12/06/2018        Labs from January 22, 2020 showed an A1c of 8.5, sodium 139, potassium 4.5, chloride 103, CO2 25, glucose 217, BUN 21, creatinine 1.1, AST 26, ALT 18, LDL 75, triglycerides 158 and urine microalbumin creatinine ratio is 20.9.    Labs from January 10, 2020 showed an A1c of 8.3%, sodium 140, potassium 5.0, chloride 105, CO 25, glucose 229, BUN 23, creatinine 1.17, LDL 60, triglycerides 127 and urine microalbumin creatinine ratio was 10.2        Medication Review: Reviewed.       Current Outpatient Medications:   •  acetaminophen-codeine (TYLENOL #3) 300-30 MG per tablet, Take 1 tablet by mouth 2 (Two) Times a Day As Needed., Disp: , Rfl:   •  aspirin 81 MG tablet, Take 1 tablet by mouth Daily., Disp: , Rfl:   •  Blood Glucose Monitoring Suppl (FREESTYLE LITE) device, FREESTYLE LITE VERENICE, Disp: , Rfl:   •  cetirizine (zyrTEC) 10 MG tablet, Take 1 tablet by mouth Daily., Disp: , Rfl:   •  EPINEPHrine (EPIPEN) 0.3 MG/0.3ML solution auto-injector injection, epinephrine 0.3 mg/0.3 mL injection, auto-injector, Disp: , Rfl:   •  FLUoxetine (PROzac) 20 MG tablet, Take 1 tablet by mouth Daily., Disp: , Rfl:   •  gabapentin (NEURONTIN) 300 MG capsule, Take 1 capsule by mouth 3 (Three) Times a Day., Disp: 270 capsule, Rfl: 0  •  glucagon (GLUCAGON EMERGENCY) 1 MG injection, Take As Directed., Disp: , Rfl:   •  glucose blood (Contour Next Test) test strip, Check blood sugars 4 times per day DX E10.65, Disp: 400 each, Rfl: 3  •  glycopyrrolate (ROBINUL) 2 MG tablet, Take 1 tablet by mouth 2 (Two) Times a Day., Disp: , Rfl:   •  hydroxychloroquine (PLAQUENIL) 200 MG tablet, Take 1 tablet by mouth Daily., Disp: 90 tablet, Rfl: 1  •  insulin glulisine (Apidra) 100 UNIT/ML injection, USE AS DIRECTED IN INSULIN PUMP, MAXIMUM DAILY DOSE 170 UNITS PER DAY DX E10.65, Disp: 150 mL, Rfl: 4  •  Lancets (FREESTYLE) lancets, 5 times daily, Disp: , Rfl:   •  lisinopril (PRINIVIL,ZESTRIL) 5 MG tablet, Take 1 tablet by mouth Daily., Disp: ,  Rfl:   •  metoprolol tartrate (LOPRESSOR) 25 MG tablet, Take 1 tablet by mouth twice daily, Disp: 180 tablet, Rfl: 2  •  omeprazole (priLOSEC) 40 MG capsule, Take 40 mg by mouth Daily., Disp: , Rfl: 3  •  simvastatin (ZOCOR) 40 MG tablet, 20 mg., Disp: , Rfl:   •  tiZANidine (ZANAFLEX) 4 MG tablet, TAKE 1/2-1 TAB BY MOUTH EVERY NIGHT AT BEDTIME, Disp: , Rfl: 1      Assessment/Plan   1.  Diabetes mellitus type 1: A1c high but blood sugar records are doing fair, at this time I recommend that she consider continuous glucose monitoring system which she is agreeable.  I will send prescription for Dexcom G6 system.  She is advised to always keep glucose source with her in case of low blood sugar and have Glucagon Emergency Kit ready and make sure sugars about 100 before she drives.  She is also advised to get regular eye exam and flu vaccine.      2.  Hypertension: Well-controlled, continue current medication.    3.  Hyperlipidemia: On simvastatin and fenofibrate, will continue current medications and follow lipid panel.    4.  Diabetic neuropathy: Stable.           Marely Peralta MD FACE.

## 2021-02-01 ENCOUNTER — TELEPHONE (OUTPATIENT)
Dept: ENDOCRINOLOGY | Facility: CLINIC | Age: 56
End: 2021-02-01

## 2021-02-01 NOTE — TELEPHONE ENCOUNTER
Spoke with pt and she has to go through a DME for her Dexcom supplies.  She requested the AOB be mailed to her.  Asked her to send AOB back filled out, signed and with BG's showing that she is checking QID.  Pt verbalized an understanding.

## 2021-02-25 ENCOUNTER — TELEPHONE (OUTPATIENT)
Dept: ENDOCRINOLOGY | Facility: CLINIC | Age: 56
End: 2021-02-25

## 2021-02-25 NOTE — TELEPHONE ENCOUNTER
AOB, Medicare DWO, insurance cards and last OV on MD desk for signature and then will be faxed to [employer]

## 2021-03-16 ENCOUNTER — OFFICE VISIT (OUTPATIENT)
Dept: CARDIOLOGY | Facility: CLINIC | Age: 56
End: 2021-03-16

## 2021-03-16 VITALS
HEIGHT: 62 IN | SYSTOLIC BLOOD PRESSURE: 113 MMHG | HEART RATE: 59 BPM | BODY MASS INDEX: 37.36 KG/M2 | WEIGHT: 203 LBS | OXYGEN SATURATION: 97 % | DIASTOLIC BLOOD PRESSURE: 60 MMHG

## 2021-03-16 DIAGNOSIS — I25.10 CHRONIC CORONARY ARTERY DISEASE: Primary | ICD-10-CM

## 2021-03-16 DIAGNOSIS — N18.30 CHRONIC RENAL IMPAIRMENT, STAGE 3 (MODERATE), UNSPECIFIED WHETHER STAGE 3A OR 3B CKD (HCC): ICD-10-CM

## 2021-03-16 DIAGNOSIS — E78.2 MIXED HYPERLIPIDEMIA: ICD-10-CM

## 2021-03-16 DIAGNOSIS — Z79.4 TYPE 2 DIABETES MELLITUS TREATED WITH INSULIN (HCC): ICD-10-CM

## 2021-03-16 DIAGNOSIS — I10 HYPERTENSION, BENIGN: ICD-10-CM

## 2021-03-16 DIAGNOSIS — E11.9 TYPE 2 DIABETES MELLITUS TREATED WITH INSULIN (HCC): ICD-10-CM

## 2021-03-16 PROCEDURE — 99214 OFFICE O/P EST MOD 30 MIN: CPT | Performed by: INTERNAL MEDICINE

## 2021-03-16 NOTE — PROGRESS NOTES
"    Subjective:     Encounter Date:03/16/2021      Patient ID: Faye De La Fuente is a 55 y.o. female.    Chief Complaint:  History of Present Illness 54-year-old white female with history of coronary disease history of diabetes hypertension hyperlipidemia and chronic insufficiency presents to my office for follow-up.  Patient has been having symptoms of chest pain or shortness of breath of inclusively for the last several days.  Patient chest pain is mostly substernal without any radiation rest with shortness of breath no complaints any PND orthopnea.  No palpitation but has some dizziness but no syncope or swelling of the feet.  She is been take her medicines regularly.  She does not smoke at this time.    The following portions of the patient's history were reviewed and updated as appropriate: allergies, current medications, past family history, past medical history, past social history, past surgical history and problem list.  Past Medical History:   Diagnosis Date   • CAD (coronary artery disease)    • Diabetes mellitus (CMS/HCC)    • Diabetes mellitus type I (CMS/HCC)    • GERD (gastroesophageal reflux disease)    • Hyperlipidemia    • Hypertension    • Myocardial infarction (CMS/HCC) 2011   • Vitamin D deficiency      Past Surgical History:   Procedure Laterality Date   • BLADDER SURGERY  2011    bladder mesh   • CHOLECYSTECTOMY     • COLONOSCOPY     • ENDOSCOPY     • EYE SURGERY  2018   • HYSTERECTOMY  2009     /60 (BP Location: Left arm, Patient Position: Sitting, Cuff Size: Adult)   Pulse 59   Ht 157.5 cm (62\")   Wt 92.1 kg (203 lb)   SpO2 97%   BMI 37.13 kg/m²   Family History   Problem Relation Age of Onset   • Diabetes Mother    • Hypertension Mother    • Diabetes Father    • Hypertension Father    • Stroke Father    • Diabetes Paternal Grandmother    • Diabetes Paternal Grandfather    • Diabetes Other         aunt   • Cancer Other         brain cancer - uncle   • Cancer Other         " breast cancer - aunt   • No Known Problems Sister    • No Known Problems Brother    • No Known Problems Maternal Aunt    • No Known Problems Maternal Uncle    • No Known Problems Paternal Aunt    • No Known Problems Paternal Uncle    • No Known Problems Maternal Grandmother    • No Known Problems Maternal Grandfather    • Anemia Neg Hx    • Arrhythmia Neg Hx    • Asthma Neg Hx    • Clotting disorder Neg Hx    • Fainting Neg Hx    • Heart attack Neg Hx    • Heart disease Neg Hx    • Heart failure Neg Hx    • Hyperlipidemia Neg Hx        Current Outpatient Medications:   •  acetaminophen-codeine (TYLENOL #3) 300-30 MG per tablet, Take 1 tablet by mouth 2 (Two) Times a Day As Needed., Disp: , Rfl:   •  aspirin 81 MG tablet, Take 1 tablet by mouth Daily., Disp: , Rfl:   •  Blood Glucose Monitoring Suppl (FREESTYLE LITE) device, FREESTYLE LITE VERENICE, Disp: , Rfl:   •  cetirizine (zyrTEC) 10 MG tablet, Take 1 tablet by mouth Daily., Disp: , Rfl:   •  Continuous Blood Gluc  (Dexcom G6 ) device, 1 Device Daily., Disp: 1 each, Rfl: 0  •  Continuous Blood Gluc Sensor (Dexcom G6 Sensor), Every 10 (Ten) Days., Disp: 2 each, Rfl: 6  •  Continuous Blood Gluc Transmit (Dexcom G6 Transmitter) misc, 1 Device Daily., Disp: 1 each, Rfl: 0  •  EPINEPHrine (EPIPEN) 0.3 MG/0.3ML solution auto-injector injection, epinephrine 0.3 mg/0.3 mL injection, auto-injector, Disp: , Rfl:   •  FLUoxetine (PROzac) 20 MG tablet, Take 1 tablet by mouth Daily., Disp: , Rfl:   •  gabapentin (NEURONTIN) 300 MG capsule, Take 1 capsule by mouth 3 (Three) Times a Day., Disp: 270 capsule, Rfl: 0  •  glucagon (GLUCAGON EMERGENCY) 1 MG injection, Take As Directed., Disp: , Rfl:   •  glucose blood (Contour Next Test) test strip, Check blood sugars 4 times per day DX E10.65, Disp: 400 each, Rfl: 3  •  glycopyrrolate (ROBINUL) 2 MG tablet, Take 1 tablet by mouth 2 (Two) Times a Day., Disp: , Rfl:   •  hydroxychloroquine (PLAQUENIL) 200 MG tablet,  Take 1 tablet by mouth Daily., Disp: 90 tablet, Rfl: 1  •  insulin glulisine (Apidra) 100 UNIT/ML injection, USE AS DIRECTED IN INSULIN PUMP, MAXIMUM DAILY DOSE 170 UNITS PER DAY DX E10.65, Disp: 150 mL, Rfl: 4  •  Lancets (FREESTYLE) lancets, 5 times daily, Disp: , Rfl:   •  lisinopril (PRINIVIL,ZESTRIL) 5 MG tablet, Take 1 tablet by mouth Daily., Disp: , Rfl:   •  metoprolol tartrate (LOPRESSOR) 25 MG tablet, Take 1 tablet by mouth twice daily, Disp: 180 tablet, Rfl: 2  •  omeprazole (priLOSEC) 40 MG capsule, Take 40 mg by mouth Daily., Disp: , Rfl: 3  •  simvastatin (ZOCOR) 40 MG tablet, 20 mg., Disp: , Rfl:   •  tiZANidine (ZANAFLEX) 4 MG tablet, TAKE 1/2-1 TAB BY MOUTH EVERY NIGHT AT BEDTIME, Disp: , Rfl: 1  Allergies   Allergen Reactions   • Ciprofloxacin Hives   • Hydrocodone Hives   • Macadamia Nut Oil Anaphylaxis   • Nitrofurantoin Rash   • Nuts Anaphylaxis   • Sulfamethoxazole-Trimethoprim Rash   • Tramadol Hcl Hives     Social History     Socioeconomic History   • Marital status:      Spouse name: Peterson De La Fuente   • Number of children: 3   • Years of education: Not on file   • Highest education level: Not on file   Tobacco Use   • Smoking status: Never Smoker   • Smokeless tobacco: Never Used   Vaping Use   • Vaping Use: Never used   Substance and Sexual Activity   • Alcohol use: No   • Drug use: No   • Sexual activity: Defer     Review of Systems   Constitutional: Negative for fever and malaise/fatigue.   HENT: Negative for congestion and hearing loss.    Eyes: Negative for double vision and visual disturbance.   Cardiovascular: Positive for chest pain. Negative for claudication, dyspnea on exertion, leg swelling and syncope.   Respiratory: Positive for shortness of breath. Negative for cough.    Endocrine: Negative for cold intolerance.   Skin: Negative for color change and rash.   Musculoskeletal: Negative for arthritis and joint pain.   Gastrointestinal: Negative for abdominal pain and  heartburn.   Genitourinary: Negative for hematuria.   Neurological: Positive for dizziness. Negative for excessive daytime sleepiness.   Psychiatric/Behavioral: Negative for depression. The patient is not nervous/anxious.    All other systems reviewed and are negative.             Objective:     Constitutional:       Appearance: Well-developed.   Eyes:      General: No scleral icterus.     Conjunctiva/sclera: Conjunctivae normal.   HENT:      Head: Normocephalic and atraumatic.   Neck:      Vascular: No carotid bruit or JVD.   Pulmonary:      Effort: Pulmonary effort is normal.      Breath sounds: Normal breath sounds. No wheezing. No rales.   Cardiovascular:      Normal rate. Regular rhythm.   Pulses:     Intact distal pulses.   Abdominal:      General: Bowel sounds are normal.      Palpations: Abdomen is soft.   Musculoskeletal:      Cervical back: Normal range of motion and neck supple. Skin:     General: Skin is warm and dry.      Findings: No rash.   Neurological:      Mental Status: Alert.       Procedures    Lab Review:         [facility]   1.chest pain or shortness of breath  Patient has been having the symptoms on and off for the last several days of aggressivity and hence will have a stress partially rule out ischemia  2.  Coronary disease  Patient has nonobstructive coronary disease in the past but she is having symptoms and hence I will perform a stress Myoview study now.  3.  Diabetes  Patient sugar levels are followed by the primary care doctor  4.  Hypertension  Patient blood pressure currently stable on medications  5.  Hyperlipidemia  Patient lipids are followed by primary care doctor and she is on a statin   6 chronic insufficiency  Patient has chronic renal insufficiency and is followed by the primary care doctor

## 2021-03-23 ENCOUNTER — OUTSIDE FACILITY SERVICE (OUTPATIENT)
Dept: CARDIOLOGY | Facility: CLINIC | Age: 56
End: 2021-03-23

## 2021-03-23 PROCEDURE — 93016 CV STRESS TEST SUPVJ ONLY: CPT | Performed by: INTERNAL MEDICINE

## 2021-03-23 PROCEDURE — 78452 HT MUSCLE IMAGE SPECT MULT: CPT | Performed by: INTERNAL MEDICINE

## 2021-03-23 PROCEDURE — 93018 CV STRESS TEST I&R ONLY: CPT | Performed by: INTERNAL MEDICINE

## 2021-06-22 ENCOUNTER — TELEPHONE (OUTPATIENT)
Dept: ENDOCRINOLOGY | Facility: CLINIC | Age: 56
End: 2021-06-22

## 2021-07-01 ENCOUNTER — TELEPHONE (OUTPATIENT)
Dept: BARIATRICS/WEIGHT MGMT | Facility: CLINIC | Age: 56
End: 2021-07-01

## 2021-07-01 NOTE — TELEPHONE ENCOUNTER
PA submitted via [email or web address]    Approved today  PA Case: [id number], Status: Approved, Coverage Starts on: 4/1/2021 12:00:00 AM, Coverage Ends on: 7/1/2022 12:00:00 AM.

## 2021-07-09 ENCOUNTER — TELEPHONE (OUTPATIENT)
Dept: ENDOCRINOLOGY | Facility: CLINIC | Age: 56
End: 2021-07-09

## 2021-07-20 RX ORDER — SIMVASTATIN 20 MG
20 TABLET ORAL DAILY
COMMUNITY
Start: 2021-06-08 | End: 2021-07-27

## 2021-07-27 ENCOUNTER — OFFICE VISIT (OUTPATIENT)
Dept: ENDOCRINOLOGY | Facility: CLINIC | Age: 56
End: 2021-07-27

## 2021-07-27 VITALS
DIASTOLIC BLOOD PRESSURE: 68 MMHG | BODY MASS INDEX: 37.54 KG/M2 | OXYGEN SATURATION: 98 % | WEIGHT: 204 LBS | SYSTOLIC BLOOD PRESSURE: 115 MMHG | HEIGHT: 62 IN | TEMPERATURE: 97.5 F | HEART RATE: 62 BPM

## 2021-07-27 DIAGNOSIS — I10 HYPERTENSION, BENIGN: ICD-10-CM

## 2021-07-27 DIAGNOSIS — E10.65 TYPE 1 DIABETES MELLITUS WITH HYPERGLYCEMIA (HCC): Primary | ICD-10-CM

## 2021-07-27 DIAGNOSIS — E11.42 DIABETIC PERIPHERAL NEUROPATHY (HCC): ICD-10-CM

## 2021-07-27 DIAGNOSIS — E78.2 MIXED HYPERLIPIDEMIA: ICD-10-CM

## 2021-07-27 LAB — GLUCOSE BLDC GLUCOMTR-MCNC: 121 MG/DL (ref 70–105)

## 2021-07-27 PROCEDURE — 99214 OFFICE O/P EST MOD 30 MIN: CPT | Performed by: INTERNAL MEDICINE

## 2021-07-27 PROCEDURE — 82962 GLUCOSE BLOOD TEST: CPT | Performed by: INTERNAL MEDICINE

## 2021-07-27 RX ORDER — PROCHLORPERAZINE 25 MG/1
1 SUPPOSITORY RECTAL DAILY
Qty: 1 EACH | Refills: 0 | Status: SHIPPED | OUTPATIENT
Start: 2021-07-27 | End: 2022-05-17 | Stop reason: SDUPTHER

## 2021-07-27 RX ORDER — PROCHLORPERAZINE 25 MG/1
SUPPOSITORY RECTAL
Qty: 2 EACH | Refills: 6 | Status: SHIPPED | OUTPATIENT
Start: 2021-07-27 | End: 2022-05-17 | Stop reason: SDUPTHER

## 2021-07-27 RX ORDER — LANCETS 28 GAUGE
EACH MISCELLANEOUS
Qty: 400 EACH | Refills: 4 | Status: SHIPPED | OUTPATIENT
Start: 2021-07-27

## 2021-07-27 RX ORDER — PERPHENAZINE 16 MG/1
TABLET, FILM COATED ORAL
Qty: 400 EACH | Refills: 3 | Status: SHIPPED | OUTPATIENT
Start: 2021-07-27 | End: 2022-08-02

## 2021-07-27 NOTE — PROGRESS NOTES
Endocrine Progress Note Outpatient     Patient Care Team:  Nalini Abarca APRN as PCP - General    Chief Complaint: Follow-up type 1 diabetes    HPI: 55-year-old female with history of type 1 diabetes since 1997, also history of hypertension, hyperlipidemia and diabetic neuropathy is here for follow-up.    For type 1 diabetes she is currently on Medtronic 670 G insulin pump.  Her current insulin pump settings are as follows basal rate midnight 2.45 units/h, 4 AM 2.85 units/h, 6 AM 2.95 units/h, 10 AM 2.75 units/h, 3 PM is 2.65 units/h.  Insulin carb ratio midnight 1 unit for each 4.3 g of carbohydrate and 4 PM is 1 unit for each 3.7 g of carbohydrate, insulin sensitive factor is 1 unit for each 10, with a target blood sugar of 100-1 35.  Active insulin is 4 hours.  He is checking blood sugars 4 times a day, I have reviewed their from her pump download.  Mostly doing fair.  She does have some hypoglycemia.  No significant issues with low blood sugars.  She has not required any assistance or hospitalization or emergency room visits since last seen for high or low blood sugar.    Hypertension: Well-controlled    Hyperlipidemia: On simvastatin and fenofibrate    Diabetic neuropathy: She has right foot drop    Past Medical History:   Diagnosis Date   • CAD (coronary artery disease)    • Diabetes mellitus (CMS/[facility])    • Diabetes mellitus type I (CMS/[facility])    • GERD (gastroesophageal reflux disease)    • Hyperlipidemia    • Hypertension    • Myocardial infarction (CMS/[facility]) 2011   • Vitamin D deficiency        Social History     Socioeconomic History   • Marital status:      Spouse name: Peterson De La Fuente   • Number of children: 3   • Years of education: Not on file   • Highest education level: Not on file   Tobacco Use   • Smoking status: Never Smoker   • Smokeless tobacco: Never Used   Vaping Use   • Vaping Use: Never used   Substance and Sexual Activity   • Alcohol use: No   • Drug use: No   • Sexual activity: Defer        Family History   Problem Relation Age of Onset   • Diabetes Mother    • Hypertension Mother    • Diabetes Father    • Hypertension Father    • Stroke Father    • Diabetes Paternal Grandmother    • Diabetes Paternal Grandfather    • Diabetes Other         aunt   • Cancer Other         brain cancer - uncle   • Cancer Other         breast cancer - aunt   • No Known Problems Sister    • No Known Problems Brother    • No Known Problems Maternal Aunt    • No Known Problems Maternal Uncle    • No Known Problems Paternal Aunt    • No Known Problems Paternal Uncle    • No Known Problems Maternal Grandmother    • No Known Problems Maternal Grandfather    • Anemia Neg Hx    • Arrhythmia Neg Hx    • Asthma Neg Hx    • Clotting disorder Neg Hx    • Fainting Neg Hx    • Heart attack Neg Hx    • Heart disease Neg Hx    • Heart failure Neg Hx    • Hyperlipidemia Neg Hx        Allergies   Allergen Reactions   • Ciprofloxacin Hives   • Hydrocodone Hives   • Macadamia Nut Oil Anaphylaxis   • Nitrofurantoin Rash   • Nuts Anaphylaxis   • Sulfamethoxazole-Trimethoprim Rash   • Tramadol Hcl Hives       ROS:   Constitutional:  Admit fatigue, tiredness.    Eyes:  Denies change in visual acuity   HENT:  Denies nasal congestion or sore throat   Respiratory: denies cough, shortness of breath.   Cardiovascular:  denies chest pain, edema   GI:  Denies abdominal pain, nausea, vomiting.   Musculoskeletal:  Denies back pain or joint pain   Integument:  Denies dry skin and rash   Neurologic:  Denies headache, focal weakness or sensory changes   Endocrine:  Denies polyuria or polydipsia   Psychiatric:  Denies depression or anxiety      Vitals:    07/27/21 1503   BP: 115/68   Pulse: 62   Temp: 97.5 °F (36.4 °C)   SpO2: 98%       Physical Exam:  GEN: NAD, conversant  EYES: EOMI, PERRL, no conjunctival erythema  NECK: no thyromegaly, full ROM   CV: RRR, no murmurs/rubs/gallops, no peripheral edema  LUNG: CTAB, no wheezes/rales/ronchi  SKIN: no  rashes, no acanthosis  MSK: no deformities, full ROM of all extremities  NEURO: no tremors, DTR normal  PSYCH: AOX3, appropriate mood, affect normal  Feet: Has callus on left sole, no ulcer seen    Results Review:     I reviewed the patient's new clinical results.      Lab Results   Component Value Date    GLUCOSE 261 (H) 12/06/2018    BUN 21 (H) 12/06/2018    CREATININE 1.2 (H) 12/06/2018    BCR 17.5 12/06/2018    K 4.5 12/06/2018    CO2 28 12/06/2018    CALCIUM 9.6 12/06/2018    ALBUMIN 4.0 12/06/2018    ALBUMIN 3.8 12/06/2018    LABIL2 1.2 12/06/2018    AST 62 (H) 12/06/2018    ALT 32 12/06/2018     Lab Results   Component Value Date    TSH 3.38 12/06/2018       Labs from July 23, 2021 reviewed showed an A1c of 8.3%, sodium 142, potassium 4.3, chloride 102, CO2 28, glucose 107, BUN 29, creatinine 1.34, GFR 45, AST 30, ALT 19, LDL 66, triglycerides 205 and urine microalbumin creatinine ratio was 14.3.    Labs from January 22, 2020 showed an A1c of 8.5, sodium 139, potassium 4.5, chloride 103, CO2 25, glucose 217, BUN 21, creatinine 1.1, AST 26, ALT 18, LDL 75, triglycerides 158 and urine microalbumin creatinine ratio is 20.9.    Labs from January 10, 2020 showed an A1c of 8.3%, sodium 140, potassium 5.0, chloride 105, CO 25, glucose 229, BUN 23, creatinine 1.17, LDL 60, triglycerides 127 and urine microalbumin creatinine ratio was 10.2        Medication Review: Reviewed.       Current Outpatient Medications:   •  acetaminophen-codeine (TYLENOL #3) 300-30 MG per tablet, Take 1 tablet by mouth 2 (Two) Times a Day As Needed., Disp: , Rfl:   •  aspirin 81 MG tablet, Take 1 tablet by mouth Daily., Disp: , Rfl:   •  Blood Glucose Monitoring Suppl (FREESTYLE LITE) device, FREESTYLE LITE VERENICE, Disp: , Rfl:   •  cetirizine (zyrTEC) 10 MG tablet, Take 1 tablet by mouth Daily., Disp: , Rfl:   •  Continuous Blood Gluc  (Dexcom G6 ) device, 1 Device Daily., Disp: 1 each, Rfl: 0  •  Continuous Blood Gluc Sensor  (Dexcom G6 Sensor), Every 10 (Ten) Days., Disp: 2 each, Rfl: 6  •  Continuous Blood Gluc Transmit (Dexcom G6 Transmitter) misc, 1 Device Daily., Disp: 1 each, Rfl: 0  •  EPINEPHrine (EPIPEN) 0.3 MG/0.3ML solution auto-injector injection, epinephrine 0.3 mg/0.3 mL injection, auto-injector, Disp: , Rfl:   •  FLUoxetine (PROzac) 20 MG tablet, Take 1 tablet by mouth Daily., Disp: , Rfl:   •  gabapentin (NEURONTIN) 300 MG capsule, Take 1 capsule by mouth 3 (Three) Times a Day., Disp: 270 capsule, Rfl: 0  •  glucagon (GLUCAGON EMERGENCY) 1 MG injection, Take As Directed., Disp: , Rfl:   •  glucose blood (Contour Next Test) test strip, Check blood sugars 4 times per day DX E10.65, Disp: 400 each, Rfl: 3  •  glycopyrrolate (ROBINUL) 2 MG tablet, Take 1 tablet by mouth 2 (Two) Times a Day., Disp: , Rfl:   •  hydroxychloroquine (PLAQUENIL) 200 MG tablet, Take 1 tablet by mouth Daily., Disp: 90 tablet, Rfl: 1  •  insulin glulisine (Apidra) 100 UNIT/ML injection, USE AS DIRECTED IN INSULIN PUMP, MAXIMUM DAILY DOSE 170 UNITS PER DAY DX E10.65, Disp: 150 mL, Rfl: 4  •  Lancets (FREESTYLE) lancets, 5 times daily, Disp: , Rfl:   •  lisinopril (PRINIVIL,ZESTRIL) 5 MG tablet, Take 1 tablet by mouth Daily., Disp: , Rfl:   •  metoprolol tartrate (LOPRESSOR) 25 MG tablet, Take 1 tablet by mouth twice daily, Disp: 180 tablet, Rfl: 2  •  omeprazole (priLOSEC) 40 MG capsule, Take 40 mg by mouth Daily., Disp: , Rfl: 3  •  simvastatin (ZOCOR) 40 MG tablet, 20 mg., Disp: , Rfl:   •  tiZANidine (ZANAFLEX) 4 MG tablet, TAKE 1/2-1 TAB BY MOUTH EVERY NIGHT AT BEDTIME, Disp: , Rfl: 1      Assessment/Plan   1.  Diabetes mellitus type 1: Uncontrolled with high A1c and hyperglycemia.  She is checking blood sugars 4 times a day.  She will benefit from continuous glucose monitoring system.  Recommend Dexcom.  Continue current insulin pump settings.  She is advised to always keep glucose source with her in case of low blood sugar and have Glucagon  Emergency Kit ready and make sure sugars about 100 before she drives.  She is also advised to get regular eye exam and flu vaccine.      2.  Hypertension: Well-controlled, continue current medication.    3.  Hyperlipidemia: On simvastatin and fenofibrate, will continue current medications and follow lipid panel.    4.  Diabetic neuropathy: Stable.           Marely Peralta MD FACE.

## 2021-07-27 NOTE — PATIENT INSTRUCTIONS
Continue current insulin pump settings  Always keep glucose source in case of low blood sugar  Annual eye exam and flu vaccine  Labs before follow-up

## 2021-07-28 ENCOUNTER — TELEPHONE (OUTPATIENT)
Dept: ENDOCRINOLOGY | Facility: CLINIC | Age: 56
End: 2021-07-28

## 2021-07-28 NOTE — TELEPHONE ENCOUNTER
PA submitted for Dexcom Line via [email or web address]    PA submitted for Contour Next test strips via [email or web address]

## 2021-07-28 NOTE — TELEPHONE ENCOUNTER
Contour Next: Approved today  PA Case: [id number], Status: Approved, Coverage Starts on: 4/28/2021 12:00:00 AM, Coverage Ends on: 7/28/2022 12:00:00 AM.

## 2021-08-23 ENCOUNTER — TELEPHONE (OUTPATIENT)
Dept: ENDOCRINOLOGY | Facility: CLINIC | Age: 56
End: 2021-08-23

## 2021-08-23 NOTE — TELEPHONE ENCOUNTER
Patient called and stated the company sending her the Dexcom needed her last office note. Per my last note, pt does not meet requirements for Dexcom according to her insurance plan. I asked the patient where she was getting it from, she could not give me that information. I asked her if she had been working with one of our educators to get this sorted out, she said no. I advised her they will need to send us a request so we know where we are sending it to and what all they need. Pt states she will contact them.

## 2021-08-23 NOTE — TELEPHONE ENCOUNTER
Pt called back, it was [facility] but she states their policy won't allow him to send a request, we have to submit the info so they can see if she qualifies. I advised patient that has already been done (parachute) and she does not qualify. Pt accepted that.

## 2021-09-01 ENCOUNTER — TELEPHONE (OUTPATIENT)
Dept: ENDOCRINOLOGY | Facility: CLINIC | Age: 56
End: 2021-09-01

## 2021-09-07 NOTE — TELEPHONE ENCOUNTER
Rx Refill Note  Requested Prescriptions     Pending Prescriptions Disp Refills   • metoprolol tartrate (LOPRESSOR) 25 MG tablet [Pharmacy Med Name: Metoprolol Tartrate 25 MG Oral Tablet] 180 tablet 0     Sig: Take 1 tablet by mouth twice daily      Last office visit with prescribing clinician: 3/16/2021      Next office visit with prescribing clinician: Visit date not found            Nalini Valdez MA  09/07/21, 10:46 EDT

## 2021-09-15 ENCOUNTER — TELEPHONE (OUTPATIENT)
Dept: ENDOCRINOLOGY | Facility: CLINIC | Age: 56
End: 2021-09-15

## 2021-09-18 ENCOUNTER — HOSPITAL ENCOUNTER (EMERGENCY)
Facility: HOSPITAL | Age: 56
Discharge: HOME | End: 2021-09-18
Admitting: EMERGENCY MEDICINE

## 2021-09-18 ENCOUNTER — APPOINTMENT (OUTPATIENT)
Dept: GENERAL RADIOLOGY | Facility: HOSPITAL | Age: 56
End: 2021-09-18

## 2021-09-18 VITALS
DIASTOLIC BLOOD PRESSURE: 62 MMHG | SYSTOLIC BLOOD PRESSURE: 131 MMHG | HEART RATE: 74 BPM | WEIGHT: 198.63 LBS | OXYGEN SATURATION: 95 % | HEIGHT: 62 IN | TEMPERATURE: 98.3 F | RESPIRATION RATE: 20 BRPM | BODY MASS INDEX: 36.55 KG/M2

## 2021-09-18 DIAGNOSIS — R11.0 NAUSEA: ICD-10-CM

## 2021-09-18 DIAGNOSIS — R19.7 DIARRHEA, UNSPECIFIED TYPE: ICD-10-CM

## 2021-09-18 DIAGNOSIS — N39.0 URINARY TRACT INFECTION WITHOUT HEMATURIA, SITE UNSPECIFIED: ICD-10-CM

## 2021-09-18 DIAGNOSIS — U07.1 PNEUMONIA DUE TO COVID-19 VIRUS: Primary | ICD-10-CM

## 2021-09-18 DIAGNOSIS — J12.82 PNEUMONIA DUE TO COVID-19 VIRUS: Primary | ICD-10-CM

## 2021-09-18 LAB
ALBUMIN SERPL-MCNC: 3.6 G/DL (ref 3.5–5.2)
ALBUMIN/GLOB SERPL: 1.2 G/DL
ALP SERPL-CCNC: 29 U/L (ref 39–117)
ALT SERPL W P-5'-P-CCNC: 16 U/L (ref 1–33)
ANION GAP SERPL CALCULATED.3IONS-SCNC: 15 MMOL/L (ref 5–15)
AST SERPL-CCNC: 32 U/L (ref 1–32)
BACTERIA UR QL AUTO: ABNORMAL /HPF
BASOPHILS # BLD AUTO: 0 10*3/MM3 (ref 0–0.2)
BASOPHILS NFR BLD AUTO: 0.2 % (ref 0–1.5)
BILIRUB SERPL-MCNC: 0.2 MG/DL (ref 0–1.2)
BILIRUB UR QL STRIP: NEGATIVE
BUN SERPL-MCNC: 40 MG/DL (ref 6–20)
BUN/CREAT SERPL: 29.4 (ref 7–25)
CALCIUM SPEC-SCNC: 8.6 MG/DL (ref 8.6–10.5)
CHLORIDE SERPL-SCNC: 97 MMOL/L (ref 98–107)
CLARITY UR: ABNORMAL
CO2 SERPL-SCNC: 21 MMOL/L (ref 22–29)
COLOR UR: YELLOW
CREAT SERPL-MCNC: 1.36 MG/DL (ref 0.57–1)
CRP SERPL-MCNC: 13.9 MG/DL (ref 0–0.5)
DEPRECATED RDW RBC AUTO: 45.5 FL (ref 37–54)
EOSINOPHIL # BLD AUTO: 0 10*3/MM3 (ref 0–0.4)
EOSINOPHIL NFR BLD AUTO: 0.1 % (ref 0.3–6.2)
ERYTHROCYTE [DISTWIDTH] IN BLOOD BY AUTOMATED COUNT: 15.4 % (ref 12.3–15.4)
FERRITIN SERPL-MCNC: 366.8 NG/ML (ref 13–150)
GFR SERPL CREATININE-BSD FRML MDRD: 40 ML/MIN/1.73
GLOBULIN UR ELPH-MCNC: 3.1 GM/DL
GLUCOSE SERPL-MCNC: 88 MG/DL (ref 65–99)
GLUCOSE UR STRIP-MCNC: NEGATIVE MG/DL
HCT VFR BLD AUTO: 36.1 % (ref 34–46.6)
HGB BLD-MCNC: 12.2 G/DL (ref 12–15.9)
HGB UR QL STRIP.AUTO: NEGATIVE
HYALINE CASTS UR QL AUTO: ABNORMAL /LPF
KETONES UR QL STRIP: NEGATIVE
LDH SERPL-CCNC: 334 U/L (ref 135–214)
LEUKOCYTE ESTERASE UR QL STRIP.AUTO: ABNORMAL
LYMPHOCYTES # BLD AUTO: 0.6 10*3/MM3 (ref 0.7–3.1)
LYMPHOCYTES NFR BLD AUTO: 14.6 % (ref 19.6–45.3)
MCH RBC QN AUTO: 28.9 PG (ref 26.6–33)
MCHC RBC AUTO-ENTMCNC: 33.8 G/DL (ref 31.5–35.7)
MCV RBC AUTO: 85.7 FL (ref 79–97)
MONOCYTES # BLD AUTO: 0.2 10*3/MM3 (ref 0.1–0.9)
MONOCYTES NFR BLD AUTO: 4.4 % (ref 5–12)
NEUTROPHILS NFR BLD AUTO: 3.5 10*3/MM3 (ref 1.7–7)
NEUTROPHILS NFR BLD AUTO: 80.7 % (ref 42.7–76)
NITRITE UR QL STRIP: POSITIVE
NRBC BLD AUTO-RTO: 0.1 /100 WBC (ref 0–0.2)
PH UR STRIP.AUTO: 5.5 [PH] (ref 5–8)
PLATELET # BLD AUTO: 169 10*3/MM3 (ref 140–450)
PMV BLD AUTO: 9.6 FL (ref 6–12)
POTASSIUM SERPL-SCNC: 4.4 MMOL/L (ref 3.5–5.2)
PROCALCITONIN SERPL-MCNC: 0.14 NG/ML (ref 0–0.25)
PROT SERPL-MCNC: 6.7 G/DL (ref 6–8.5)
PROT UR QL STRIP: NEGATIVE
RBC # BLD AUTO: 4.21 10*6/MM3 (ref 3.77–5.28)
RBC # UR: ABNORMAL /HPF
REF LAB TEST METHOD: ABNORMAL
SODIUM SERPL-SCNC: 133 MMOL/L (ref 136–145)
SP GR UR STRIP: 1.02 (ref 1–1.03)
SQUAMOUS #/AREA URNS HPF: ABNORMAL /HPF
UROBILINOGEN UR QL STRIP: ABNORMAL
WBC # BLD AUTO: 4.4 10*3/MM3 (ref 3.4–10.8)
WBC UR QL AUTO: ABNORMAL /HPF

## 2021-09-18 PROCEDURE — 25010000002 CEFTRIAXONE PER 250 MG: Performed by: NURSE PRACTITIONER

## 2021-09-18 PROCEDURE — 87077 CULTURE AEROBIC IDENTIFY: CPT | Performed by: NURSE PRACTITIONER

## 2021-09-18 PROCEDURE — 84145 PROCALCITONIN (PCT): CPT | Performed by: NURSE PRACTITIONER

## 2021-09-18 PROCEDURE — 85025 COMPLETE CBC W/AUTO DIFF WBC: CPT | Performed by: NURSE PRACTITIONER

## 2021-09-18 PROCEDURE — 81001 URINALYSIS AUTO W/SCOPE: CPT | Performed by: NURSE PRACTITIONER

## 2021-09-18 PROCEDURE — 99283 EMERGENCY DEPT VISIT LOW MDM: CPT

## 2021-09-18 PROCEDURE — 96375 TX/PRO/DX INJ NEW DRUG ADDON: CPT

## 2021-09-18 PROCEDURE — 80053 COMPREHEN METABOLIC PANEL: CPT | Performed by: NURSE PRACTITIONER

## 2021-09-18 PROCEDURE — 87186 SC STD MICRODIL/AGAR DIL: CPT | Performed by: NURSE PRACTITIONER

## 2021-09-18 PROCEDURE — 71045 X-RAY EXAM CHEST 1 VIEW: CPT

## 2021-09-18 PROCEDURE — 83615 LACTATE (LD) (LDH) ENZYME: CPT | Performed by: NURSE PRACTITIONER

## 2021-09-18 PROCEDURE — 87086 URINE CULTURE/COLONY COUNT: CPT | Performed by: NURSE PRACTITIONER

## 2021-09-18 PROCEDURE — 25010000002 ONDANSETRON PER 1 MG: Performed by: NURSE PRACTITIONER

## 2021-09-18 PROCEDURE — 96374 THER/PROPH/DIAG INJ IV PUSH: CPT

## 2021-09-18 PROCEDURE — 82728 ASSAY OF FERRITIN: CPT | Performed by: NURSE PRACTITIONER

## 2021-09-18 PROCEDURE — 86140 C-REACTIVE PROTEIN: CPT | Performed by: NURSE PRACTITIONER

## 2021-09-18 RX ORDER — ONDANSETRON 2 MG/ML
4 INJECTION INTRAMUSCULAR; INTRAVENOUS ONCE
Status: COMPLETED | OUTPATIENT
Start: 2021-09-18 | End: 2021-09-18

## 2021-09-18 RX ORDER — ONDANSETRON 4 MG/1
4 TABLET, ORALLY DISINTEGRATING ORAL EVERY 8 HOURS PRN
Qty: 20 TABLET | Refills: 0 | Status: SHIPPED | OUTPATIENT
Start: 2021-09-18 | End: 2021-09-28

## 2021-09-18 RX ORDER — CEFDINIR 300 MG/1
300 CAPSULE ORAL 2 TIMES DAILY
Qty: 14 CAPSULE | Refills: 0 | Status: SHIPPED | OUTPATIENT
Start: 2021-09-18 | End: 2021-09-28

## 2021-09-18 RX ORDER — SODIUM CHLORIDE 0.9 % (FLUSH) 0.9 %
10 SYRINGE (ML) INJECTION AS NEEDED
Status: DISCONTINUED | OUTPATIENT
Start: 2021-09-18 | End: 2021-09-18 | Stop reason: HOSPADM

## 2021-09-18 RX ADMIN — CEFTRIAXONE 1 G: 10 INJECTION, POWDER, FOR SOLUTION INTRAVENOUS at 14:54

## 2021-09-18 RX ADMIN — SODIUM CHLORIDE, POTASSIUM CHLORIDE, SODIUM LACTATE AND CALCIUM CHLORIDE 1000 ML: 600; 310; 30; 20 INJECTION, SOLUTION INTRAVENOUS at 13:36

## 2021-09-18 RX ADMIN — SODIUM CHLORIDE 1000 ML: 9 INJECTION, SOLUTION INTRAVENOUS at 14:54

## 2021-09-18 RX ADMIN — ONDANSETRON 4 MG: 2 INJECTION INTRAMUSCULAR; INTRAVENOUS at 13:29

## 2021-09-18 NOTE — ED PROVIDER NOTES
Subjective        Chief complaint: Multiple complaints      Context: Patient is a 55-year-old female who comes in ambulatory by private vehicle with her  with complaints of cough and congestion. She denies any shortness of breath chest pain swelling in her legs or feet. She states that she became symptomatic on September 7 and tested positive for Covid on September 9. She is unvaccinated. She states over the last 3 to 4 days she has had nausea with dry heaving and very little productive vomiting. Denies abd pain. She denies any urinary complaints. She states she had five episodes of diarrhea yesterday. She reports chills without documented fever. Has not been evaluated or called her MD about these complaints.  She states she is a type I diabetic that was diagnosed at age 30 and currently has an insulin pump but has not been checking her sugar.  States her  had symptoms before she became symptomatic.    Duration: as above    Timing: waxes and wanes    Severity:    Associated symptoms: Has not been taking any decongestants          PCP:  daniele          Review of Systems   Constitutional: Positive for chills and fatigue. Negative for fever.   HENT: Positive for congestion.    Eyes: Negative for visual disturbance.   Respiratory: Positive for cough. Negative for shortness of breath.    Cardiovascular: Negative.    Gastrointestinal: Positive for diarrhea, nausea and vomiting.   Genitourinary: Negative.    Musculoskeletal: Negative.    Skin: Negative.    Allergic/Immunologic: Negative for immunocompromised state.   Neurological: Negative.    Hematological: Does not bruise/bleed easily.   Psychiatric/Behavioral: Negative for confusion.       Past Medical History:   Diagnosis Date   • CAD (coronary artery disease)    • Diabetes mellitus (CMS/[facility])    • Diabetes mellitus type I (CMS/[facility])    • GERD (gastroesophageal reflux disease)    • Hyperlipidemia    • Hypertension    • Myocardial infarction (CMS/[facility]) 2011    • Vitamin D deficiency        Allergies   Allergen Reactions   • Ciprofloxacin Hives   • Hydrocodone Hives   • Macadamia Nut Oil Anaphylaxis   • Nitrofurantoin Rash   • Nuts Anaphylaxis   • Sulfamethoxazole-Trimethoprim Rash   • Tramadol Hcl Hives       Past Surgical History:   Procedure Laterality Date   • BLADDER SURGERY  2011    bladder mesh   • CHOLECYSTECTOMY     • COLONOSCOPY     • ENDOSCOPY     • EYE SURGERY  2018   • HYSTERECTOMY  2009       Family History   Problem Relation Age of Onset   • Diabetes Mother    • Hypertension Mother    • Diabetes Father    • Hypertension Father    • Stroke Father    • Diabetes Paternal Grandmother    • Diabetes Paternal Grandfather    • Diabetes Other         aunt   • Cancer Other         brain cancer - uncle   • Cancer Other         breast cancer - aunt   • No Known Problems Sister    • No Known Problems Brother    • No Known Problems Maternal Aunt    • No Known Problems Maternal Uncle    • No Known Problems Paternal Aunt    • No Known Problems Paternal Uncle    • No Known Problems Maternal Grandmother    • No Known Problems Maternal Grandfather    • Anemia Neg Hx    • Arrhythmia Neg Hx    • Asthma Neg Hx    • Clotting disorder Neg Hx    • Fainting Neg Hx    • Heart attack Neg Hx    • Heart disease Neg Hx    • Heart failure Neg Hx    • Hyperlipidemia Neg Hx        Social History     Socioeconomic History   • Marital status:      Spouse name: Peterson De La Fuente   • Number of children: 3   • Years of education: Not on file   • Highest education level: Not on file   Tobacco Use   • Smoking status: Never Smoker   • Smokeless tobacco: Never Used   Vaping Use   • Vaping Use: Never used   Substance and Sexual Activity   • Alcohol use: No   • Drug use: No   • Sexual activity: Defer           Objective   Physical Exam     Vital signs and triage nurse note reviewed.   Constitutional: Awake, alert; well-developed and well-nourished. No acute distress is noted. Nontoxic in  appearance.  at bedside  HEENT: Normocephalic, atraumatic; pupils are PERRL with intact EOM; oropharynx is pink and moist without exudate or erythema.   Neck: Supple, full range of motion without pain;    Cardiovascular: Regular rate and rhythm, normal S1-S2.   Pulmonary: Respiratory effort regular nonlabored, breath sounds diminished bilateral lower lobes   Abdomen: Soft, nontender nondistended with normoactive bowel sounds; no rebound or guarding.   Musculoskeletal: Independent range of motion of all extremities with no palpable tenderness or edema.   Neuro: Alert oriented x3, speech is clear and appropriate, GCS 15   Skin:  Fleshtone warm, dry, intact; no erythematous or petechial rash or lesion       Procedures           ED Course      Labs Reviewed   COMPREHENSIVE METABOLIC PANEL - Abnormal; Notable for the following components:       Result Value    BUN 40 (*)     Creatinine 1.36 (*)     Sodium 133 (*)     Chloride 97 (*)     CO2 21.0 (*)     Alkaline Phosphatase 29 (*)     eGFR Non African Amer 40 (*)     BUN/Creatinine Ratio 29.4 (*)     All other components within normal limits    Narrative:     GFR Normal >60  Chronic Kidney Disease <60  Kidney Failure <15     LACTATE DEHYDROGENASE - Abnormal; Notable for the following components:     (*)     All other components within normal limits   FERRITIN - Abnormal; Notable for the following components:    Ferritin 366.80 (*)     All other components within normal limits    Narrative:     Results may be falsely decreased if patient taking Biotin.     C-REACTIVE PROTEIN - Abnormal; Notable for the following components:    C-Reactive Protein 13.90 (*)     All other components within normal limits   URINALYSIS W/ CULTURE IF INDICATED - Abnormal; Notable for the following components:    Appearance, UA Cloudy (*)     Leuk Esterase, UA Small (1+) (*)     Nitrite, UA Positive (*)     All other components within normal limits   CBC WITH AUTO DIFFERENTIAL -  "Abnormal; Notable for the following components:    Neutrophil % 80.7 (*)     Lymphocyte % 14.6 (*)     Monocyte % 4.4 (*)     Eosinophil % 0.1 (*)     Lymphocytes, Absolute 0.60 (*)     All other components within normal limits   URINALYSIS, MICROSCOPIC ONLY - Abnormal; Notable for the following components:    WBC, UA 31-50 (*)     Bacteria, UA 3+ (*)     All other components within normal limits   PROCALCITONIN - Normal    Narrative:     As a Marker for Sepsis (Non-Neonates):     1. <0.5 ng/mL represents a low risk of severe sepsis and/or septic shock.  2. >2 ng/mL represents a high risk of severe sepsis and/or septic shock.    As a Marker for Lower Respiratory Tract Infections that require antibiotic therapy:  PCT on Admission     Antibiotic Therapy             6-12 Hrs later  >0.5                          Strongly Recommended            >0.25 - <0.5             Recommended  0.1 - 0.25                  Discouraged                       Remeasure/reassess PCT  <0.1                         Strongly Discouraged         Remeasure/reassess PCT      As 28 day mortality risk marker: \"Change in Procalcitonin Result\" (>80% or <=80%) if Day 0 (or Day 1) and Day 4 values are available. Refer to [email or web address]pct-calculator.[email or web address]/    Change in PCT <=80 %   A decrease of PCT levels below or equal to 80% defines a positive change in PCT test result representing a higher risk for 28-day all-cause mortality of patients diagnosed with severe sepsis or septic shock.    Change in PCT >80 %   A decrease of PCT levels of more than 80% defines a negative change in PCT result representing a lower risk for 28-day all-cause mortality of patients diagnosed with severe sepsis or septic shock.              Results may be falsely decreased if patient taking Biotin.    URINE CULTURE   CBC AND DIFFERENTIAL    Narrative:     The following orders were created for panel order CBC & Differential.  Procedure                               " Abnormality         Status                     ---------                               -----------         ------                     CBC Auto Differential[[id number]]        Abnormal            Final result                 Please view results for these tests on the individual orders.     Medications   sodium chloride 0.9 % flush 10 mL (has no administration in time range)   sodium chloride 0.9 % bolus 1,000 mL (1,000 mL Intravenous New Bag 9/18/21 1454)   cefTRIAXone (ROCEPHIN) in SWFI 1 gram/10ml IV PUSH syringe (1 g Intravenous Given 9/18/21 1454)   lactated ringers bolus 1,000 mL (0 mL Intravenous Stopped 9/18/21 1454)   ondansetron (ZOFRAN) injection 4 mg (4 mg Intravenous Given 9/18/21 1329)     XR Chest AP    Result Date: 9/18/2021  Mild multifocal patchy airspace opacities bilaterally likely related to acute viral illness.  Electronically Signed By-Maira Barry MD On:9/18/2021 1:40 PM This report was finalized on [id number] by  Maira Barry MD.    Prior to Admission medications    Medication Sig Start Date End Date Taking? Authorizing Provider   acetaminophen-codeine (TYLENOL #3) 300-30 MG per tablet Take 1 tablet by mouth 2 (Two) Times a Day As Needed. 3/26/20   Prasanth Pozo MD   aspirin 81 MG tablet Take 1 tablet by mouth Daily. 7/26/13   Prasanth Pozo MD   Blood Glucose Monitoring Suppl (FREESTYLE LITE) device FREESTYLE LITE VERENICE 11/16/18   Prasanth Pozo MD   cetirizine (zyrTEC) 10 MG tablet Take 1 tablet by mouth Daily.    Prasanth Pozo MD   Continuous Blood Gluc  (Dexcom G6 ) device 1 Device Daily. 7/27/21   Marely Peralta MD   Continuous Blood Gluc Sensor (Dexcom G6 Sensor) Every 10 (Ten) Days. 7/27/21   Marely Peralta MD   Continuous Blood Gluc Transmit (Dexcom G6 Transmitter) misc 1 Device Daily. 7/27/21   Marely Peralta MD   EPINEPHrine (EPIPEN) 0.3 MG/0.3ML solution auto-injector injection epinephrine 0.3 mg/0.3 mL injection, auto-injector     Prasanth Pozo MD   FLUoxetine (PROzac) 20 MG tablet Take 1 tablet by mouth Daily. 7/15/19   Prasanth Pozo MD   gabapentin (NEURONTIN) 300 MG capsule Take 1 capsule by mouth 3 (Three) Times a Day. 1/20/20   Brandy Byers APRN   glucose blood (Contour Next Test) test strip Check blood sugars 4 times per day DX E10.65 7/27/21   Marely Peralta MD   glycopyrrolate (ROBINUL) 2 MG tablet Take 1 tablet by mouth 2 (Two) Times a Day. 11/22/17   Prasanth Pozo MD   hydroxychloroquine (PLAQUENIL) 200 MG tablet Take 1 tablet by mouth Daily. 4/7/20   Brandy Byers APRN   insulin glulisine (Apidra) 100 UNIT/ML injection USE AS DIRECTED IN INSULIN PUMP, MAXIMUM DAILY DOSE 170 UNITS PER DAY DX E10.65 8/3/20   Marely Peralta MD   Lancets (freestyle) lancets 5 times daily 7/27/21   Marely Peralta MD   lisinopril (PRINIVIL,ZESTRIL) 5 MG tablet Take 1 tablet by mouth Daily. 7/16/19   Prasanth Pozo MD   metoprolol tartrate (LOPRESSOR) 25 MG tablet Take 1 tablet by mouth twice daily 9/7/21   Samson Thapa MD   omeprazole (priLOSEC) 40 MG capsule Take 40 mg by mouth Daily. 7/5/19   Prasanth Pozo MD   simvastatin (ZOCOR) 40 MG tablet 20 mg. 7/25/19   Prasanth Pozo MD   tiZANidine (ZANAFLEX) 4 MG tablet TAKE 1/2-1 TAB BY MOUTH EVERY NIGHT AT BEDTIME 8/30/19   Prasanth Pozo MD                                          [facility]  Number of Diagnoses or Management Options  Diarrhea, unspecified type  Nausea  Pneumonia due to COVID-19 virus  Urinary tract infection without hematuria, site unspecified  Diagnosis management comments:       Comorbidities:  has a past medical history of CAD (coronary artery disease), Diabetes mellitus (CMS/[facility]), Diabetes mellitus type I (CMS/[facility]), GERD (gastroesophageal reflux disease), Hyperlipidemia, Hypertension, Myocardial infarction (CMS/[facility]) (2011), and Vitamin D deficiency.  Differentials: Acidosis infection pneumonia virus dehydration not all  inclusive of differentials considered  Radiology interpretation:  X-rays reviewed by me and interpreted by radiologist,   XR Chest AP    Result Date: 9/18/2021  Mild multifocal patchy airspace opacities bilaterally likely related to acute viral illness.  Electronically Signed By-Maira Barry MD On:9/18/2021 1:40 PM This report was finalized on [id number] by  Maira Barry MD.    Lab interpretation:  Labs viewed by me significant for, BUN 40, creatinine 1.3-comparison creatinine from 2 years ago 1.2    Appropriate PPE worn during exam.  Patient was given IV fluids and Zofran.  She was given additional liter as well as Rocephin.  She does not meet criteria for Regeneron, she does not appear hypoxic and is not requiring oxygen therapy does not meet criteria for admission to the hospital.    i discussed findings with patient who voices understanding of discharge instructions, signs and symptoms requiring return to ED; discharged improved and in stable condition with follow up for re-evaluation.  Will be discharged home with Zofran and Omnicef          Final diagnoses:   Pneumonia due to COVID-19 virus   Diarrhea, unspecified type   Nausea   Urinary tract infection without hematuria, site unspecified       ED Disposition  ED Disposition     ED Disposition Condition Comment    Discharge Stable           Nalini Abarca, APRN  [address] [address]  [phone]    Schedule an appointment as soon as possible for a visit            Medication List      New Prescriptions    cefdinir 300 MG capsule  Commonly known as: OMNICEF  Take 1 capsule by mouth 2 (Two) Times a Day.     ondansetron ODT 4 MG disintegrating tablet  Commonly known as: ZOFRAN-ODT  Place 1 tablet on the tongue Every 8 (Eight) Hours As Needed for Nausea.           Where to Get Your Medications      These medications were sent to [facility] [address] [place] [address] [place] - [address] [facility] - [phone] [facility] [phone]   [address]  [facility] [place] [address]    Phone: [phone]   · cefdinir 300 MG capsule  · ondansetron ODT 4 MG disintegrating tablet          Juliette Castillo, ROB  09/18/21 1457       Juliette Castillo, ROB  09/18/21 1454

## 2021-09-18 NOTE — ED NOTES
Orthostatic    Lay  BP-99/39  HR-65    Sit (dizzy)  BP-91/40  HR-73    Stand  BP-80/42  HR-78     Kuldeep Hunter, RN  09/18/21 0800

## 2021-09-18 NOTE — DISCHARGE INSTRUCTIONS
Tylenol Motrin as needed.  Plenty of fluids.  Medications as directed.  Follow-up with your family doctor next week for urine recheck.  Return for any new or worsening symptoms

## 2021-09-18 NOTE — ED NOTES
Pt provided a bedside to get a urine sample.  said he will help her to the bedside when she needs to go.     Kuldeep Hunter RN  09/18/21 9202

## 2021-09-20 LAB — BACTERIA SPEC AEROBE CULT: ABNORMAL

## 2021-09-20 NOTE — PHARMACY RECOMMENDATION
Patient urine culture resulted with E. coli. Susceptible to Cephalosporins (3rd gen). Patient was given Rx for cefdinir. Therapy is appropriate coverage. No further follow-up required..    Microbiology Results (last 10 days)       Procedure Component Value - Date/Time    Urine Culture - Urine, Urine, Clean Catch [[id number]]  (Abnormal)  (Susceptibility) Collected: 09/18/21 1351    Lab Status: Final result Specimen: Urine, Clean Catch Updated: 09/20/21 1140     Urine Culture >100,000 CFU/mL Escherichia coli    Susceptibility        Escherichia coli      PIA      Ampicillin Resistant      Ampicillin + Sulbactam Intermediate      Cefazolin Susceptible      Cefepime Susceptible      Ceftazidime Susceptible      Ceftriaxone Susceptible      Gentamicin Susceptible      Levofloxacin Susceptible      Nitrofurantoin Susceptible      Piperacillin + Tazobactam Susceptible      Tetracycline Susceptible      Trimethoprim + Sulfamethoxazole Resistant                 Linear View                               Sharon Caldera RPH  9/20/2021 12:26 EDT

## 2021-09-28 ENCOUNTER — OFFICE VISIT (OUTPATIENT)
Dept: CARDIOLOGY | Facility: CLINIC | Age: 56
End: 2021-09-28

## 2021-09-28 VITALS
DIASTOLIC BLOOD PRESSURE: 51 MMHG | HEIGHT: 62 IN | BODY MASS INDEX: 36.62 KG/M2 | WEIGHT: 199 LBS | HEART RATE: 64 BPM | SYSTOLIC BLOOD PRESSURE: 115 MMHG | OXYGEN SATURATION: 97 %

## 2021-09-28 DIAGNOSIS — N18.30 CHRONIC RENAL IMPAIRMENT, STAGE 3 (MODERATE), UNSPECIFIED WHETHER STAGE 3A OR 3B CKD (HCC): ICD-10-CM

## 2021-09-28 DIAGNOSIS — I10 HYPERTENSION, BENIGN: ICD-10-CM

## 2021-09-28 DIAGNOSIS — I25.10 CHRONIC CORONARY ARTERY DISEASE: Primary | ICD-10-CM

## 2021-09-28 DIAGNOSIS — Z79.4 TYPE 2 DIABETES MELLITUS TREATED WITH INSULIN (HCC): ICD-10-CM

## 2021-09-28 DIAGNOSIS — E78.2 MIXED HYPERLIPIDEMIA: ICD-10-CM

## 2021-09-28 DIAGNOSIS — E11.9 TYPE 2 DIABETES MELLITUS TREATED WITH INSULIN (HCC): ICD-10-CM

## 2021-09-28 PROCEDURE — 99214 OFFICE O/P EST MOD 30 MIN: CPT | Performed by: INTERNAL MEDICINE

## 2021-09-28 RX ORDER — SIMVASTATIN 20 MG
20 TABLET ORAL DAILY
COMMUNITY
Start: 2021-09-03

## 2021-09-28 RX ORDER — FLUOXETINE 20 MG/1
TABLET ORAL
COMMUNITY
End: 2022-08-29 | Stop reason: SDUPTHER

## 2021-09-28 NOTE — PROGRESS NOTES
"    Subjective:     Encounter Date:09/28/2021      Patient ID: Faye De La Fuente is a 55 y.o. female.    Chief Complaint: Coronary artery disease  History of Present Illness 54-year-old white female with history of coronary disease status post stent placement the past history of hypertension hyperlipidemia diabetes and chronic renal  She presents to my office for follow-up.  Patient is currently stable without any symptoms of chest pain or shortness of breath at rest on exertion.  No complaints any PND orthopnea.  No palpitation but has occasional dizziness.  No syncope or swelling of the feet.  She is taking her medicines regularly she does not smoke.      The following portions of the patient's history were reviewed and updated as appropriate: allergies, current medications, past family history, past medical history, past social history, past surgical history and problem list.  Past Medical History:   Diagnosis Date   • CAD (coronary artery disease)    • Diabetes mellitus (CMS/HCC)    • Diabetes mellitus type I (CMS/HCC)    • GERD (gastroesophageal reflux disease)    • Hyperlipidemia    • Hypertension    • Myocardial infarction (CMS/HCC) 2011   • Vitamin D deficiency      Past Surgical History:   Procedure Laterality Date   • BLADDER SURGERY  2011    bladder mesh   • CHOLECYSTECTOMY     • COLONOSCOPY     • ENDOSCOPY     • EYE SURGERY  2018   • HYSTERECTOMY  2009     /51 (BP Location: Left arm, Patient Position: Sitting, Cuff Size: Large Adult)   Pulse 64   Ht 157.5 cm (62\")   Wt 90.3 kg (199 lb)   SpO2 97%   BMI 36.40 kg/m²   Family History   Problem Relation Age of Onset   • Diabetes Mother    • Hypertension Mother    • Diabetes Father    • Hypertension Father    • Stroke Father    • Diabetes Paternal Grandmother    • Diabetes Paternal Grandfather    • Diabetes Other         aunt   • Cancer Other         brain cancer - uncle   • Cancer Other         breast cancer - aunt   • No Known Problems Sister "    • No Known Problems Brother    • No Known Problems Maternal Aunt    • No Known Problems Maternal Uncle    • No Known Problems Paternal Aunt    • No Known Problems Paternal Uncle    • No Known Problems Maternal Grandmother    • No Known Problems Maternal Grandfather    • Anemia Neg Hx    • Arrhythmia Neg Hx    • Asthma Neg Hx    • Clotting disorder Neg Hx    • Fainting Neg Hx    • Heart attack Neg Hx    • Heart disease Neg Hx    • Heart failure Neg Hx    • Hyperlipidemia Neg Hx        Current Outpatient Medications:   •  aspirin 81 MG tablet, Take 1 tablet by mouth Daily., Disp: , Rfl:   •  Blood Glucose Monitoring Suppl (FREESTYLE LITE) device, FREESTYLE LITE VERENICE, Disp: , Rfl:   •  cetirizine (zyrTEC) 10 MG tablet, Take 1 tablet by mouth Daily., Disp: , Rfl:   •  Continuous Blood Gluc  (Dexcom G6 ) device, 1 Device Daily., Disp: 1 each, Rfl: 0  •  Continuous Blood Gluc Sensor (Dexcom G6 Sensor), Every 10 (Ten) Days., Disp: 2 each, Rfl: 6  •  Continuous Blood Gluc Transmit (Dexcom G6 Transmitter) misc, 1 Device Daily., Disp: 1 each, Rfl: 0  •  EPINEPHrine (EPIPEN) 0.3 MG/0.3ML solution auto-injector injection, epinephrine 0.3 mg/0.3 mL injection, auto-injector, Disp: , Rfl:   •  FLUoxetine HCl, PMDD, 20 MG tablet, fluoxetine 20 mg tablet  Take 1 tablet by mouth once daily-Patient needs appointment, Disp: , Rfl:   •  gabapentin (NEURONTIN) 300 MG capsule, Take 1 capsule by mouth 3 (Three) Times a Day., Disp: 270 capsule, Rfl: 0  •  glucose blood (Contour Next Test) test strip, Check blood sugars 4 times per day DX E10.65, Disp: 400 each, Rfl: 3  •  glycopyrrolate (ROBINUL) 2 MG tablet, Take 1 tablet by mouth 2 (Two) Times a Day., Disp: , Rfl:   •  hydroxychloroquine (PLAQUENIL) 200 MG tablet, Take 1 tablet by mouth Daily., Disp: 90 tablet, Rfl: 1  •  insulin glulisine (Apidra) 100 UNIT/ML injection, USE AS DIRECTED IN INSULIN PUMP, MAXIMUM DAILY DOSE 170 UNITS PER DAY DX E10.65, Disp: 150 mL, Rfl:  4  •  Lancets (freestyle) lancets, 5 times daily, Disp: 400 each, Rfl: 4  •  lisinopril (PRINIVIL,ZESTRIL) 5 MG tablet, Take 1 tablet by mouth Daily., Disp: , Rfl:   •  metoprolol tartrate (LOPRESSOR) 25 MG tablet, Take 1 tablet by mouth twice daily, Disp: 180 tablet, Rfl: 0  •  omeprazole (priLOSEC) 40 MG capsule, Take 40 mg by mouth Daily., Disp: , Rfl: 3  •  simvastatin (ZOCOR) 20 MG tablet, Take 20 mg by mouth Daily., Disp: , Rfl:   Allergies   Allergen Reactions   • Ciprofloxacin Hives   • Hydrocodone Hives   • Macadamia Nut Oil Anaphylaxis   • Nitrofurantoin Rash   • Nuts Anaphylaxis   • Sulfamethoxazole-Trimethoprim Rash   • Tramadol Hcl Hives     Social History     Socioeconomic History   • Marital status:      Spouse name: Peterson De La Fuente   • Number of children: 3   • Years of education: Not on file   • Highest education level: Not on file   Tobacco Use   • Smoking status: Never Smoker   • Smokeless tobacco: Never Used   Vaping Use   • Vaping Use: Never used   Substance and Sexual Activity   • Alcohol use: No   • Drug use: No   • Sexual activity: Defer     Review of Systems   Constitutional: Negative for fever and malaise/fatigue.   HENT: Negative for congestion and hearing loss.    Eyes: Negative for double vision and visual disturbance.   Cardiovascular: Negative for chest pain, claudication, dyspnea on exertion, leg swelling and syncope.   Respiratory: Negative for cough and shortness of breath.    Endocrine: Negative for cold intolerance.   Skin: Negative for color change and rash.   Musculoskeletal: Negative for arthritis and joint pain.   Gastrointestinal: Negative for abdominal pain and heartburn.   Genitourinary: Negative for hematuria.   Neurological: Positive for dizziness and numbness. Negative for excessive daytime sleepiness.   Psychiatric/Behavioral: Negative for depression. The patient is not nervous/anxious.    All other systems reviewed and are negative.             Objective:      Constitutional:       Appearance: Well-developed.   Eyes:      General: No scleral icterus.     Conjunctiva/sclera: Conjunctivae normal.   HENT:      Head: Normocephalic and atraumatic.   Neck:      Vascular: No carotid bruit or JVD.   Pulmonary:      Effort: Pulmonary effort is normal.      Breath sounds: Normal breath sounds. No wheezing. No rales.   Cardiovascular:      Normal rate. Regular rhythm.   Pulses:     Intact distal pulses.   Abdominal:      General: Bowel sounds are normal.      Palpations: Abdomen is soft.   Musculoskeletal:      Cervical back: Normal range of motion and neck supple. Skin:     General: Skin is warm and dry.      Findings: No rash.   Neurological:      Mental Status: Alert.       Procedures    Lab Review:         MDM #1 coronary disease  Patient has nonobstructive disease now with normal be systolic function is currently stable on medications  2.  Hypertension  Patient blood pressure is currently stable on medical therapy  3.  Hyperlipidemia  Patient is on simvastatin and the lipid levels are within normal meds  4.  Diabetes  Patient is on oral medicines and insulin and followed by the diabetologist  5.  Chronic renal insufficiency.  Patient is currently stable and followed by nephrologist      Patient's previous medical records, labs, and EKG were reviewed and discussed with the patient at today's visit.

## 2021-10-15 RX ORDER — INSULIN GLULISINE 100 [IU]/ML
INJECTION, SOLUTION SUBCUTANEOUS
Qty: 150 ML | Refills: 11 | Status: SHIPPED | OUTPATIENT
Start: 2021-10-15 | End: 2023-03-01 | Stop reason: SDUPTHER

## 2021-11-12 ENCOUNTER — TRANSCRIBE ORDERS (OUTPATIENT)
Dept: ADMINISTRATIVE | Facility: HOSPITAL | Age: 56
End: 2021-11-12

## 2021-11-12 DIAGNOSIS — Z12.31 ENCOUNTER FOR SCREENING MAMMOGRAM FOR MALIGNANT NEOPLASM OF BREAST: Primary | ICD-10-CM

## 2021-12-01 NOTE — TELEPHONE ENCOUNTER
Rx Refill Note  Requested Prescriptions     Pending Prescriptions Disp Refills   • metoprolol tartrate (LOPRESSOR) 25 MG tablet [Pharmacy Med Name: Metoprolol Tartrate 25 MG Oral Tablet] 180 tablet 0     Sig: Take 1 tablet by mouth twice daily      Last office visit with prescribing clinician: 9/28/2021      Next office visit with prescribing clinician: f/u in Reagan Gonzalez MA  12/01/21, 07:56 EST

## 2021-12-20 NOTE — TELEPHONE ENCOUNTER
Rx Refill Note  Requested Prescriptions     Pending Prescriptions Disp Refills   • metoprolol tartrate (LOPRESSOR) 25 MG tablet [Pharmacy Med Name: Metoprolol Tartrate 25 MG Oral Tablet] 180 tablet 0     Sig: Take 1 tablet by mouth twice daily      Last office visit with prescribing clinician: 9/28/2021      Next office visit with prescribing clinician: f/u in Reagan Gonzalez MA  12/20/21, 12:16 EST

## 2022-01-11 ENCOUNTER — OFFICE (AMBULATORY)
Dept: URBAN - METROPOLITAN AREA CLINIC 64 | Facility: CLINIC | Age: 57
End: 2022-01-11

## 2022-01-11 ENCOUNTER — HOSPITAL ENCOUNTER (OUTPATIENT)
Dept: MAMMOGRAPHY | Facility: HOSPITAL | Age: 57
Discharge: HOME | End: 2022-01-11
Admitting: NURSE PRACTITIONER

## 2022-01-11 VITALS
DIASTOLIC BLOOD PRESSURE: 62 MMHG | HEART RATE: 64 BPM | WEIGHT: 212 LBS | HEIGHT: 62 IN | SYSTOLIC BLOOD PRESSURE: 147 MMHG

## 2022-01-11 DIAGNOSIS — Z12.31 ENCOUNTER FOR SCREENING MAMMOGRAM FOR MALIGNANT NEOPLASM OF BREAST: ICD-10-CM

## 2022-01-11 DIAGNOSIS — K58.0 IRRITABLE BOWEL SYNDROME WITH DIARRHEA: ICD-10-CM

## 2022-01-11 PROCEDURE — 77067 SCR MAMMO BI INCL CAD: CPT

## 2022-01-11 PROCEDURE — 77063 BREAST TOMOSYNTHESIS BI: CPT

## 2022-01-11 PROCEDURE — 99204 OFFICE O/P NEW MOD 45 MIN: CPT | Performed by: INTERNAL MEDICINE

## 2022-01-19 RX ORDER — TIZANIDINE 4 MG/1
TABLET ORAL
COMMUNITY
Start: 2021-12-22

## 2022-01-19 RX ORDER — PREDNISONE 1 MG/1
TABLET ORAL
COMMUNITY
Start: 2021-10-21 | End: 2022-01-27

## 2022-01-19 RX ORDER — FLUOXETINE 20 MG/1
TABLET, FILM COATED ORAL
COMMUNITY
Start: 2022-01-10

## 2022-01-19 RX ORDER — ACETAMINOPHEN AND CODEINE PHOSPHATE 300; 30 MG/1; MG/1
1 TABLET ORAL 2 TIMES DAILY
COMMUNITY
Start: 2021-12-22

## 2022-01-27 ENCOUNTER — OFFICE VISIT (OUTPATIENT)
Dept: ENDOCRINOLOGY | Facility: CLINIC | Age: 57
End: 2022-01-27

## 2022-01-27 VITALS
HEIGHT: 62 IN | TEMPERATURE: 97.1 F | BODY MASS INDEX: 38.83 KG/M2 | WEIGHT: 211 LBS | HEART RATE: 96 BPM | SYSTOLIC BLOOD PRESSURE: 125 MMHG | OXYGEN SATURATION: 94 % | DIASTOLIC BLOOD PRESSURE: 70 MMHG

## 2022-01-27 DIAGNOSIS — E10.65 TYPE 1 DIABETES MELLITUS WITH HYPERGLYCEMIA: Primary | ICD-10-CM

## 2022-01-27 DIAGNOSIS — E11.42 DIABETIC PERIPHERAL NEUROPATHY: ICD-10-CM

## 2022-01-27 DIAGNOSIS — E78.2 MIXED HYPERLIPIDEMIA: ICD-10-CM

## 2022-01-27 DIAGNOSIS — I10 HYPERTENSION, BENIGN: ICD-10-CM

## 2022-01-27 PROBLEM — B34.2 CORONAVIRUS INFECTION: Status: ACTIVE | Noted: 2021-09-09

## 2022-01-27 PROBLEM — D64.9 ANEMIA: Status: ACTIVE | Noted: 2021-11-11

## 2022-01-27 PROBLEM — U07.1 COVID-19: Status: ACTIVE | Noted: 2021-09-12

## 2022-01-27 PROBLEM — K58.9 IRRITABLE BOWEL SYNDROME: Status: ACTIVE | Noted: 2021-11-11

## 2022-01-27 PROBLEM — R50.9 FEVER, UNSPECIFIED: Status: ACTIVE | Noted: 2021-09-12

## 2022-01-27 PROCEDURE — 99214 OFFICE O/P EST MOD 30 MIN: CPT | Performed by: INTERNAL MEDICINE

## 2022-01-27 RX ORDER — SORBITOL SOLUTION 70 %
SOLUTION, ORAL MISCELLANEOUS SEE ADMIN INSTRUCTIONS
COMMUNITY
Start: 2022-01-24

## 2022-01-27 NOTE — PROGRESS NOTES
Endocrine Progress Note Outpatient     Patient Care Team:  Nalini Abarca APRN as PCP - General    Chief Complaint: Follow-up type 1 diabetes    HPI: 56-year-old female with history of type 1 diabetes since 1997, also history of hypertension, hyperlipidemia and diabetic neuropathy is here for follow-up.    For type 1 diabetes she is currently on Med[employer] 670 G insulin pump.  Her current insulin pump settings are as follows basal rate midnight 2.45 units/h, 4 AM 2.85 units/h, 6 AM 2.95 units/h, 10 AM 2.75 units/h, 3 PM is 2.65 units/h.  Insulin carb ratio midnight 1 unit for each 4.3 g of carbohydrate and 4 PM is 1 unit for each 3.7 g of carbohydrate, insulin sensitive factor is 1 unit for each 10, with a target blood sugar of 100-1 35.  Active insulin is 4 hours.  He is checking blood sugars 4 times a day, she has not required any assistance or hospitalization or emergency room visits since last seen for high or low blood sugar.    Hypertension: Well-controlled    Hyperlipidemia: On simvastatin and fenofibrate    Diabetic neuropathy: She has right foot drop    Past Medical History:   Diagnosis Date   • CAD (coronary artery disease)    • Diabetes mellitus (HCC)    • Diabetes mellitus type I (HCC)    • GERD (gastroesophageal reflux disease)    • Hyperlipidemia    • Hypertension    • Myocardial infarction (HCC) 2011   • Vitamin D deficiency        Social History     Socioeconomic History   • Marital status:      Spouse name: Peterson De La Fuente   • Number of children: 3   Tobacco Use   • Smoking status: Never Smoker   • Smokeless tobacco: Never Used   Vaping Use   • Vaping Use: Never used   Substance and Sexual Activity   • Alcohol use: No   • Drug use: No   • Sexual activity: Defer       Family History   Problem Relation Age of Onset   • Diabetes Mother    • Hypertension Mother    • Diabetes Father    • Hypertension Father    • Stroke Father    • Diabetes Paternal Grandmother    • Diabetes Paternal Grandfather     • Diabetes Other         aunt   • Cancer Other         brain cancer - uncle   • Cancer Other         breast cancer - aunt   • No Known Problems Sister    • No Known Problems Brother    • No Known Problems Maternal Aunt    • No Known Problems Maternal Uncle    • No Known Problems Paternal Aunt    • No Known Problems Paternal Uncle    • No Known Problems Maternal Grandmother    • No Known Problems Maternal Grandfather    • Anemia Neg Hx    • Arrhythmia Neg Hx    • Asthma Neg Hx    • Clotting disorder Neg Hx    • Fainting Neg Hx    • Heart attack Neg Hx    • Heart disease Neg Hx    • Heart failure Neg Hx    • Hyperlipidemia Neg Hx        Allergies   Allergen Reactions   • Ciprofloxacin Hives   • Hydrocodone Hives   • Macadamia Nut Oil Anaphylaxis   • Nitrofurantoin Rash   • Nuts Anaphylaxis   • Sulfamethoxazole-Trimethoprim Rash   • Tramadol Hcl Hives       ROS:   Constitutional:  Admit fatigue, tiredness.    Eyes:  Denies change in visual acuity   HENT:  Denies nasal congestion or sore throat   Respiratory: denies cough, shortness of breath.   Cardiovascular:  denies chest pain, edema   GI:  Denies abdominal pain, nausea, vomiting.   Musculoskeletal:  Denies back pain or joint pain   Integument:  Denies dry skin and rash   Neurologic:  Denies headache, focal weakness or sensory changes   Endocrine:  Denies polyuria or polydipsia   Psychiatric:  Denies depression or anxiety      Vitals:    01/27/22 1114   BP: 125/70   Pulse: 96   Temp: 97.1 °F (36.2 °C)   SpO2: 94%       Physical Exam:  GEN: NAD, conversant  EYES: EOMI, PERRL, no conjunctival erythema  NECK: no thyromegaly, full ROM   CV: RRR, no murmurs/rubs/gallops, no peripheral edema  LUNG: CTAB, no wheezes/rales/ronchi  SKIN: no rashes, no acanthosis  MSK: no deformities, full ROM of all extremities  NEURO: no tremors, DTR normal  PSYCH: AOX3, appropriate mood, affect normal  Feet: Has callus on left sole, no ulcer seen    Results Review:     I reviewed the  patient's new clinical results.    Data reviewed: Labs from January 24 of 2022 showed a serum sodium 144, potassium 5.1, chloride 104, CO2 28, glucose 151, BUN 24, creatinine 1.1, AST 24, ALT 20, urine microalbumin ratio was 8.8, A1c 7.5, LDL 71 and triglycerides 196.      Medication Review: Reviewed.       Current Outpatient Medications:   •  acetaminophen-codeine (TYLENOL #3) 300-30 MG per tablet, Take 1 tablet by mouth 2 (Two) Times a Day., Disp: , Rfl:   •  aspirin 81 MG tablet, Take 1 tablet by mouth Daily., Disp: , Rfl:   •  Blood Glucose Monitoring Suppl (FREESTYLE LITE) device, FREESTYLE LITE VERENICE, Disp: , Rfl:   •  cetirizine (zyrTEC) 10 MG tablet, Take 1 tablet by mouth Daily., Disp: , Rfl:   •  EPINEPHrine (EPIPEN) 0.3 MG/0.3ML solution auto-injector injection, epinephrine 0.3 mg/0.3 mL injection, auto-injector, Disp: , Rfl:   •  FLUoxetine (PROzac) 20 MG tablet, , Disp: , Rfl:   •  FLUoxetine HCl, PMDD, 20 MG tablet, fluoxetine 20 mg tablet  Take 1 tablet by mouth once daily-Patient needs appointment, Disp: , Rfl:   •  gabapentin (NEURONTIN) 300 MG capsule, Take 1 capsule by mouth 3 (Three) Times a Day., Disp: 270 capsule, Rfl: 0  •  glucose blood (Contour Next Test) test strip, Check blood sugars 4 times per day DX E10.65, Disp: 400 each, Rfl: 3  •  glycopyrrolate (ROBINUL) 2 MG tablet, Take 1 tablet by mouth 2 (Two) Times a Day., Disp: , Rfl:   •  hydroxychloroquine (PLAQUENIL) 200 MG tablet, Take 1 tablet by mouth Daily., Disp: 90 tablet, Rfl: 1  •  insulin glulisine (Apidra) 100 UNIT/ML injection, USE AS DIRECTED IN INSULIN PUMP. MAX DAILY DOSE 170 UNITS PER DAY (E10.65), Disp: 150 mL, Rfl: 11  •  Lancets (freestyle) lancets, 5 times daily, Disp: 400 each, Rfl: 4  •  lisinopril (PRINIVIL,ZESTRIL) 5 MG tablet, Take 1 tablet by mouth Daily., Disp: , Rfl:   •  metoprolol tartrate (LOPRESSOR) 25 MG tablet, Take 1 tablet by mouth twice daily, Disp: 180 tablet, Rfl: 0  •  omeprazole (priLOSEC) 40 MG  capsule, Take 40 mg by mouth Daily., Disp: , Rfl: 3  •  simvastatin (ZOCOR) 20 MG tablet, Take 20 mg by mouth Daily., Disp: , Rfl:   •  tiZANidine (ZANAFLEX) 4 MG tablet, , Disp: , Rfl:   •  Continuous Blood Gluc  (Dexcom G6 ) device, 1 Device Daily., Disp: 1 each, Rfl: 0  •  Continuous Blood Gluc Sensor (Dexcom G6 Sensor), Every 10 (Ten) Days., Disp: 2 each, Rfl: 6  •  Continuous Blood Gluc Transmit (Dexcom G6 Transmitter) misc, 1 Device Daily., Disp: 1 each, Rfl: 0  •  sorbitol 70 % solution solution, See Admin Instructions., Disp: , Rfl:       Assessment/Plan   1.  Diabetes mellitus type 1: Uncontrolled with hyperglycemia with A1c of 7.5%.  No blood sugar records for review.  We will continue current insulin pump regimen.  She is advised to always keep glucose source with her in case of low blood sugar and have Glucagon Emergency Kit ready and make sure sugars about 100 before she drives.  She is also advised to get regular eye exam and flu vaccine.      2.  Hypertension: Well-controlled, continue current medications.    3.  Hyperlipidemia: Well-controlled, continue simvastatin and fenofibrate.    4.  Diabetic neuropathy: Stable.    Assessment and plan from July 20 7 October 2021 reviewed and updated.          Marely Peralta MD FACE.

## 2022-03-10 ENCOUNTER — OFFICE (AMBULATORY)
Dept: URBAN - METROPOLITAN AREA PATHOLOGY 4 | Facility: PATHOLOGY | Age: 57
End: 2022-03-10
Payer: COMMERCIAL

## 2022-03-10 ENCOUNTER — ON CAMPUS - OUTPATIENT (AMBULATORY)
Dept: URBAN - METROPOLITAN AREA HOSPITAL 2 | Facility: HOSPITAL | Age: 57
End: 2022-03-10
Payer: COMMERCIAL

## 2022-03-10 VITALS
HEART RATE: 61 BPM | WEIGHT: 198 LBS | HEART RATE: 77 BPM | HEART RATE: 81 BPM | DIASTOLIC BLOOD PRESSURE: 75 MMHG | DIASTOLIC BLOOD PRESSURE: 76 MMHG | DIASTOLIC BLOOD PRESSURE: 54 MMHG | DIASTOLIC BLOOD PRESSURE: 65 MMHG | DIASTOLIC BLOOD PRESSURE: 62 MMHG | OXYGEN SATURATION: 98 % | SYSTOLIC BLOOD PRESSURE: 133 MMHG | OXYGEN SATURATION: 100 % | SYSTOLIC BLOOD PRESSURE: 145 MMHG | SYSTOLIC BLOOD PRESSURE: 115 MMHG | HEART RATE: 76 BPM | RESPIRATION RATE: 18 BRPM | DIASTOLIC BLOOD PRESSURE: 56 MMHG | HEIGHT: 62 IN | OXYGEN SATURATION: 96 % | HEART RATE: 74 BPM | SYSTOLIC BLOOD PRESSURE: 96 MMHG | SYSTOLIC BLOOD PRESSURE: 147 MMHG | SYSTOLIC BLOOD PRESSURE: 156 MMHG | TEMPERATURE: 98 F | HEART RATE: 65 BPM | SYSTOLIC BLOOD PRESSURE: 107 MMHG | DIASTOLIC BLOOD PRESSURE: 53 MMHG | RESPIRATION RATE: 16 BRPM | HEART RATE: 72 BPM | RESPIRATION RATE: 17 BRPM | HEART RATE: 71 BPM | SYSTOLIC BLOOD PRESSURE: 113 MMHG | HEART RATE: 69 BPM | OXYGEN SATURATION: 99 %

## 2022-03-10 DIAGNOSIS — K21.9 GASTRO-ESOPHAGEAL REFLUX DISEASE WITHOUT ESOPHAGITIS: ICD-10-CM

## 2022-03-10 DIAGNOSIS — K31.89 OTHER DISEASES OF STOMACH AND DUODENUM: ICD-10-CM

## 2022-03-10 DIAGNOSIS — R19.7 DIARRHEA, UNSPECIFIED: ICD-10-CM

## 2022-03-10 DIAGNOSIS — D12.5 BENIGN NEOPLASM OF SIGMOID COLON: ICD-10-CM

## 2022-03-10 DIAGNOSIS — D12.1 BENIGN NEOPLASM OF APPENDIX: ICD-10-CM

## 2022-03-10 DIAGNOSIS — D12.0 BENIGN NEOPLASM OF CECUM: ICD-10-CM

## 2022-03-10 DIAGNOSIS — R13.10 DYSPHAGIA, UNSPECIFIED: ICD-10-CM

## 2022-03-10 DIAGNOSIS — K29.60 OTHER GASTRITIS WITHOUT BLEEDING: ICD-10-CM

## 2022-03-10 DIAGNOSIS — D12.3 BENIGN NEOPLASM OF TRANSVERSE COLON: ICD-10-CM

## 2022-03-10 DIAGNOSIS — D64.9 ANEMIA, UNSPECIFIED: ICD-10-CM

## 2022-03-10 DIAGNOSIS — D12.2 BENIGN NEOPLASM OF ASCENDING COLON: ICD-10-CM

## 2022-03-10 PROBLEM — K63.5 POLYP OF COLON: Status: ACTIVE | Noted: 2022-03-10

## 2022-03-10 LAB
GI HISTOLOGY: A. SELECT: (no result)
GI HISTOLOGY: B. SELECT: (no result)
GI HISTOLOGY: C. UNSPECIFIED: (no result)
GI HISTOLOGY: D. UNSPECIFIED: (no result)
GI HISTOLOGY: E. UNSPECIFIED: (no result)
GI HISTOLOGY: F. UNSPECIFIED: (no result)
GI HISTOLOGY: G. UNSPECIFIED: (no result)
GI HISTOLOGY: H. UNSPECIFIED: (no result)
GI HISTOLOGY: PDF REPORT: (no result)

## 2022-03-10 PROCEDURE — 43239 EGD BIOPSY SINGLE/MULTIPLE: CPT | Performed by: INTERNAL MEDICINE

## 2022-03-10 PROCEDURE — 43450 DILATE ESOPHAGUS 1/MULT PASS: CPT | Performed by: INTERNAL MEDICINE

## 2022-03-10 PROCEDURE — 45385 COLONOSCOPY W/LESION REMOVAL: CPT | Performed by: INTERNAL MEDICINE

## 2022-03-10 PROCEDURE — 45380 COLONOSCOPY AND BIOPSY: CPT | Mod: 59 | Performed by: INTERNAL MEDICINE

## 2022-03-10 PROCEDURE — 88305 TISSUE EXAM BY PATHOLOGIST: CPT | Mod: 26 | Performed by: INTERNAL MEDICINE

## 2022-03-10 RX ORDER — COLESTIPOL HYDROCHLORIDE 1 G/1
2 TABLET, FILM COATED ORAL
Qty: 60 | Refills: 2 | Status: COMPLETED
Start: 2022-03-10 | End: 2022-04-11

## 2022-03-10 NOTE — TELEPHONE ENCOUNTER
Rx Refill Note  Requested Prescriptions     Pending Prescriptions Disp Refills   • metoprolol tartrate (LOPRESSOR) 25 MG tablet [Pharmacy Med Name: Metoprolol Tartrate 25 MG Oral Tablet] 180 tablet 0     Sig: Take 1 tablet by mouth twice daily      Last office visit with prescribing clinician: 9/28/2021      Next office visit with prescribing clinician: Visit date not found            Nalini Valdez MA  03/10/22, 08:18 EST

## 2022-04-11 ENCOUNTER — OFFICE (AMBULATORY)
Dept: URBAN - METROPOLITAN AREA CLINIC 64 | Facility: CLINIC | Age: 57
End: 2022-04-11

## 2022-04-11 VITALS
WEIGHT: 211 LBS | DIASTOLIC BLOOD PRESSURE: 47 MMHG | SYSTOLIC BLOOD PRESSURE: 145 MMHG | HEIGHT: 62 IN | HEART RATE: 66 BPM

## 2022-04-11 DIAGNOSIS — R19.7 DIARRHEA, UNSPECIFIED: ICD-10-CM

## 2022-04-11 PROCEDURE — 99214 OFFICE O/P EST MOD 30 MIN: CPT | Performed by: NURSE PRACTITIONER

## 2022-04-11 RX ORDER — METOCLOPRAMIDE 5 MG/1
TABLET ORAL
Qty: 0 | Refills: 0 | Status: COMPLETED
End: 2023-06-22

## 2022-05-17 ENCOUNTER — TELEPHONE (OUTPATIENT)
Dept: ENDOCRINOLOGY | Facility: CLINIC | Age: 57
End: 2022-05-17

## 2022-05-17 DIAGNOSIS — E10.65 TYPE 1 DIABETES MELLITUS WITH HYPERGLYCEMIA: Primary | ICD-10-CM

## 2022-05-17 RX ORDER — PROCHLORPERAZINE 25 MG/1
SUPPOSITORY RECTAL
Qty: 9 EACH | Refills: 3 | Status: SHIPPED | OUTPATIENT
Start: 2022-05-17 | End: 2022-11-08

## 2022-05-17 RX ORDER — PROCHLORPERAZINE 25 MG/1
1 SUPPOSITORY RECTAL CONTINUOUS
Qty: 1 EACH | Refills: 3 | Status: SHIPPED | OUTPATIENT
Start: 2022-05-17 | End: 2022-11-08

## 2022-05-17 RX ORDER — PROCHLORPERAZINE 25 MG/1
1 SUPPOSITORY RECTAL CONTINUOUS
Qty: 1 EACH | Refills: 0 | Status: SHIPPED | OUTPATIENT
Start: 2022-05-17 | End: 2022-11-08

## 2022-06-03 ENCOUNTER — TELEPHONE (OUTPATIENT)
Dept: ENDOCRINOLOGY | Facility: CLINIC | Age: 57
End: 2022-06-03

## 2022-06-03 NOTE — TELEPHONE ENCOUNTER
PA on Apidra done in [facility].     PA Case: [id number], Status: Approved, Coverage Starts on: 3/4/2022 12:00:00 AM, Coverage Ends on: 6/3/2023 12:00:00 AM.

## 2022-06-10 ENCOUNTER — TELEPHONE (OUTPATIENT)
Dept: ENDOCRINOLOGY | Facility: CLINIC | Age: 57
End: 2022-06-10

## 2022-06-30 ENCOUNTER — TELEPHONE (OUTPATIENT)
Dept: ENDOCRINOLOGY | Facility: CLINIC | Age: 57
End: 2022-06-30

## 2022-08-24 RX ORDER — CELECOXIB 100 MG/1
CAPSULE ORAL
COMMUNITY
Start: 2022-08-19

## 2022-08-24 RX ORDER — COLESEVELAM 180 1/1
625 TABLET ORAL
COMMUNITY
Start: 2022-07-30

## 2022-08-24 RX ORDER — METOCLOPRAMIDE 5 MG/1
5 TABLET ORAL 2 TIMES DAILY
COMMUNITY
Start: 2022-08-02

## 2022-08-29 ENCOUNTER — OFFICE VISIT (OUTPATIENT)
Dept: ENDOCRINOLOGY | Facility: CLINIC | Age: 57
End: 2022-08-29

## 2022-08-29 VITALS
HEIGHT: 62 IN | SYSTOLIC BLOOD PRESSURE: 115 MMHG | WEIGHT: 215 LBS | HEART RATE: 63 BPM | BODY MASS INDEX: 39.56 KG/M2 | DIASTOLIC BLOOD PRESSURE: 68 MMHG | OXYGEN SATURATION: 97 %

## 2022-08-29 DIAGNOSIS — I10 HYPERTENSION, BENIGN: ICD-10-CM

## 2022-08-29 DIAGNOSIS — E10.65 TYPE 1 DIABETES MELLITUS WITH HYPERGLYCEMIA: Primary | ICD-10-CM

## 2022-08-29 DIAGNOSIS — E78.2 MIXED HYPERLIPIDEMIA: ICD-10-CM

## 2022-08-29 DIAGNOSIS — E11.42 DIABETIC PERIPHERAL NEUROPATHY: ICD-10-CM

## 2022-08-29 LAB — GLUCOSE BLDC GLUCOMTR-MCNC: 164 MG/DL (ref 70–105)

## 2022-08-29 PROCEDURE — 99214 OFFICE O/P EST MOD 30 MIN: CPT | Performed by: INTERNAL MEDICINE

## 2022-08-29 PROCEDURE — 82962 GLUCOSE BLOOD TEST: CPT | Performed by: INTERNAL MEDICINE

## 2022-08-29 RX ORDER — TIZANIDINE HYDROCHLORIDE 4 MG/1
CAPSULE, GELATIN COATED ORAL
COMMUNITY
End: 2022-08-29 | Stop reason: SDUPTHER

## 2022-08-29 RX ORDER — CYANOCOBALAMIN (VITAMIN B-12) 2500 MCG
TABLET, SUBLINGUAL SUBLINGUAL
COMMUNITY

## 2022-08-29 RX ORDER — AZELASTINE HYDROCHLORIDE 0.5 MG/ML
SOLUTION/ DROPS OPHTHALMIC
COMMUNITY
Start: 2022-07-21

## 2022-08-29 NOTE — PATIENT INSTRUCTIONS
Continue current insulin pump regimen  Watch for low blood sugars  Please try to get a continuous monitoring system approved  Always keep glucose source in case of low blood sugar  Annual eye exam and flu vaccine  Labs before follow-up.

## 2022-08-29 NOTE — PROGRESS NOTES
Endocrine Progress Note Outpatient     Patient Care Team:  Nalini Abarca APRN as PCP - General    Chief Complaint: Follow-up type 1 diabetes    HPI: 56-year-old female with history of type 1 diabetes since 1997, also history of hypertension, hyperlipidemia and diabetic neuropathy is here for follow-up.    For type 1 diabetes she is currently on Med[employer] 670 G insulin pump.  Her current insulin pump settings are as follows basal rate midnight 2.45 units/h, 4 AM 2.85 units/h, 6 AM 2.95 units/h, 10 AM 2.75 units/h, 3 PM is 2.65 units/h.  Insulin carb ratio midnight 1 unit for each 4.3 g of carbohydrate and 4 PM is 1 unit for each 3.7 g of carbohydrate, insulin sensitive factor is 1 unit for each 10, with a target blood sugar of 100-1 35.  Active insulin is 4 hours.  He is checking blood sugars 4 times a day, she has not required any assistance or hospitalization or emergency room visits since last seen for high or low blood sugar.  She will definitely benefit from a continuous glucose monitoring system.  It has been prescribed, she is working with her insurance to get it approved.    Hypertension: Well-controlled    Hyperlipidemia: On simvastatin and fenofibrate    Diabetic neuropathy: She has right foot drop    Past Medical History:   Diagnosis Date   • CAD (coronary artery disease)    • Diabetes mellitus (HCC)    • Diabetes mellitus type I (HCC)    • GERD (gastroesophageal reflux disease)    • Hyperlipidemia    • Hypertension    • Myocardial infarction (HCC) 2011   • Vitamin D deficiency        Social History     Socioeconomic History   • Marital status:      Spouse name: Peterson De La Fuente   • Number of children: 3   • Years of education: 12   Tobacco Use   • Smoking status: Never Smoker   • Smokeless tobacco: Never Used   Vaping Use   • Vaping Use: Never used   Substance and Sexual Activity   • Alcohol use: No   • Drug use: No   • Sexual activity: Defer       Family History   Problem Relation Age of Onset    • Diabetes Mother    • Hypertension Mother    • Diabetes Father    • Hypertension Father    • Stroke Father    • Diabetes Paternal Grandmother    • Diabetes Paternal Grandfather    • Diabetes Other         aunt   • Cancer Other         brain cancer - uncle   • Cancer Other         breast cancer - aunt   • No Known Problems Sister    • No Known Problems Brother    • No Known Problems Maternal Aunt    • No Known Problems Maternal Uncle    • No Known Problems Paternal Aunt    • No Known Problems Paternal Uncle    • No Known Problems Maternal Grandmother    • No Known Problems Maternal Grandfather    • Anemia Neg Hx    • Arrhythmia Neg Hx    • Asthma Neg Hx    • Clotting disorder Neg Hx    • Fainting Neg Hx    • Heart attack Neg Hx    • Heart disease Neg Hx    • Heart failure Neg Hx    • Hyperlipidemia Neg Hx        Allergies   Allergen Reactions   • Ciprofloxacin Hives   • Hydrocodone Hives   • Macadamia Nut Oil Anaphylaxis   • Nitrofurantoin Rash   • Nuts Anaphylaxis   • Sulfamethoxazole-Trimethoprim Rash   • Tramadol Hcl Hives   • Black [place] Pollen Allergy Skin Test Hives   • Trimethoprim Unknown - Low Severity       ROS:   Constitutional:  Admit fatigue, tiredness.    Eyes:  Denies change in visual acuity   HENT:  Denies nasal congestion or sore throat   Respiratory: denies cough, shortness of breath.   Cardiovascular:  denies chest pain, edema   GI:  Denies abdominal pain, nausea, vomiting.   Musculoskeletal:  Denies back pain or joint pain   Integument:  Denies dry skin and rash   Neurologic:  Denies headache, focal weakness or sensory changes   Endocrine:  Denies polyuria or polydipsia   Psychiatric:  Denies depression or anxiety      Vitals:    08/29/22 0954   BP: 115/68   Pulse: 63   SpO2: 97%     BMI: 39.3  Physical Exam:  GEN: NAD, conversant  EYES: EOMI, PERRL, no conjunctival erythema  NECK: no thyromegaly, full ROM   CV: RRR, no murmurs/rubs/gallops, no peripheral edema  LUNG: CTAB, no  wheezes/rales/ronchi  SKIN: no rashes, no acanthosis  MSK: no deformities, full ROM of all extremities  NEURO: no tremors, DTR normal  PSYCH: AOX3, appropriate mood, affect normal  Feet: Has callus on left sole, no ulcer seen    Results Review:     I reviewed the patient's new clinical results.    Labs from August 25, 2022 showed a sodium 137, potassium 4.8, chloride 100, CO2 30, glucose 140, BUN 26, creatinine 1.1, AST 29, ALT 24  LDL 72, triglycerides 179, A1c 7.9%.    Medication Review: Reviewed.       Current Outpatient Medications:   •  acetaminophen-codeine (TYLENOL #3) 300-30 MG per tablet, Take 1 tablet by mouth 2 (Two) Times a Day., Disp: , Rfl:   •  aspirin 81 MG tablet, Take 1 tablet by mouth Daily., Disp: , Rfl:   •  azelastine (OPTIVAR) 0.05 % ophthalmic solution, azelastine 0.05 % eye drops, Disp: , Rfl:   •  Biotin 5000 MCG sublingual tablet, , Disp: , Rfl:   •  Blood Glucose Monitoring Suppl (FREESTYLE LITE) device, FREESTYLE LITE VERENICE, Disp: , Rfl:   •  celecoxib (CeleBREX) 100 MG capsule, TAKE 1 CAPSULE BY MOUTH TWICE DAILY AS NEEDED WITH FOOD FOR ARTHRITIS, Disp: , Rfl:   •  cetirizine (zyrTEC) 10 MG tablet, Take 1 tablet by mouth Daily., Disp: , Rfl:   •  colesevelam (WELCHOL) 625 MG tablet, Take 625 mg by mouth 2 (Two) Times a Day Before Meals., Disp: , Rfl:   •  Continuous Blood Gluc  (Dexcom G6 ) device, 1 Device Continuous. Use for continuous blood glucose monitoring, Disp: 1 each, Rfl: 0  •  Continuous Blood Gluc Sensor (Dexcom G6 Sensor), Use for continuous blood glucose monitoring, replace sensor every 10 days, Disp: 9 each, Rfl: 3  •  Continuous Blood Gluc Transmit (Dexcom G6 Transmitter) misc, 1 Device Continuous. Use for continuous blood glucose monitoring, replace transmitter every 90 days, Disp: 1 each, Rfl: 3  •  EPINEPHrine (EPIPEN) 0.3 MG/0.3ML solution auto-injector injection, epinephrine 0.3 mg/0.3 mL injection, auto-injector, Disp: , Rfl:   •  FLUoxetine (PROzac)  20 MG tablet, , Disp: , Rfl:   •  gabapentin (NEURONTIN) 300 MG capsule, Take 1 capsule by mouth 3 (Three) Times a Day., Disp: 270 capsule, Rfl: 0  •  glucose blood (Contour Next Test) test strip, USE 1 STRIP TO CHECK GLUCOSE 4 TIMES DAILY, Disp: 400 each, Rfl: 0  •  glycopyrrolate (ROBINUL) 2 MG tablet, Take 1 tablet by mouth 2 (Two) Times a Day., Disp: , Rfl:   •  hydroxychloroquine (PLAQUENIL) 200 MG tablet, Take 1 tablet by mouth Daily., Disp: 90 tablet, Rfl: 1  •  insulin glulisine (Apidra) 100 UNIT/ML injection, USE AS DIRECTED IN INSULIN PUMP. MAX DAILY DOSE 170 UNITS PER DAY (E10.65), Disp: 150 mL, Rfl: 11  •  Lancets (freestyle) lancets, 5 times daily, Disp: 400 each, Rfl: 4  •  lisinopril (PRINIVIL,ZESTRIL) 5 MG tablet, Take 1 tablet by mouth Daily., Disp: , Rfl:   •  metoclopramide (REGLAN) 5 MG tablet, Take 5 mg by mouth 2 (Two) Times a Day., Disp: , Rfl:   •  metoprolol tartrate (LOPRESSOR) 25 MG tablet, Take 1 tablet by mouth twice daily, Disp: 180 tablet, Rfl: 0  •  omeprazole (priLOSEC) 40 MG capsule, Take 40 mg by mouth Daily., Disp: , Rfl: 3  •  simvastatin (ZOCOR) 20 MG tablet, Take 20 mg by mouth Daily., Disp: , Rfl:   •  sorbitol 70 % solution solution, See Admin Instructions., Disp: , Rfl:   •  tiZANidine (ZANAFLEX) 4 MG tablet, , Disp: , Rfl:       Assessment & Plan   1.  Diabetes mellitus type 1 with hyperglycemia: Uncontrolled with A1c at 7.9%.  I have reviewed her blood sugar records, no low blood sugars noted.  She will definitely benefit from continuous glucose monitoring system.  She is trying to work on it.  We will continue current insulin pump regimen.  She is advised to always keep glucose source with her in case of low blood sugar and have Glucagon Emergency Kit ready and make sure sugars about 100 before she drives.  She is also advised to get regular eye exam and flu vaccine.      2.  Hypertension: Well-controlled, continue current medications.    3.  Hyperlipidemia:  Well-controlled, continue simvastatin and fenofibrate.    4.  Diabetic neuropathy: Stable.    Assessment and plan from January 27, 2022 reviewed and updated.          Marely Peralta MD FACE.

## 2022-09-12 NOTE — TELEPHONE ENCOUNTER
Rx Refill Note  Requested Prescriptions     Pending Prescriptions Disp Refills   • metoprolol tartrate (LOPRESSOR) 25 MG tablet [Pharmacy Med Name: Metoprolol Tartrate 25 MG Oral Tablet] 180 tablet 0     Sig: Take 1 tablet by mouth twice daily      Last office visit with prescribing clinician: 9/28/2021      Next office visit with prescribing clinician: Visit date not found            Donte España MA  09/12/22, 08:33 EDT

## 2022-10-18 ENCOUNTER — PATIENT MESSAGE (OUTPATIENT)
Dept: CARDIOLOGY | Facility: CLINIC | Age: 57
End: 2022-10-18

## 2022-11-08 ENCOUNTER — OFFICE VISIT (OUTPATIENT)
Dept: CARDIOLOGY | Facility: CLINIC | Age: 57
End: 2022-11-08

## 2022-11-08 VITALS
BODY MASS INDEX: 36.8 KG/M2 | HEART RATE: 62 BPM | OXYGEN SATURATION: 97 % | DIASTOLIC BLOOD PRESSURE: 61 MMHG | WEIGHT: 200 LBS | HEIGHT: 62 IN | SYSTOLIC BLOOD PRESSURE: 125 MMHG

## 2022-11-08 DIAGNOSIS — E11.9 TYPE 2 DIABETES MELLITUS TREATED WITH INSULIN: ICD-10-CM

## 2022-11-08 DIAGNOSIS — I10 HYPERTENSION, BENIGN: ICD-10-CM

## 2022-11-08 DIAGNOSIS — Z79.4 TYPE 2 DIABETES MELLITUS TREATED WITH INSULIN: ICD-10-CM

## 2022-11-08 DIAGNOSIS — E78.2 MIXED HYPERLIPIDEMIA: ICD-10-CM

## 2022-11-08 DIAGNOSIS — I25.10 CHRONIC CORONARY ARTERY DISEASE: Primary | ICD-10-CM

## 2022-11-08 PROCEDURE — 99214 OFFICE O/P EST MOD 30 MIN: CPT | Performed by: INTERNAL MEDICINE

## 2022-11-08 NOTE — PROGRESS NOTES
"    Subjective:     Encounter Date:11/08/2022      Patient ID: Faye De La Fuente is a 57 y.o. female.    Chief Complaint:  History of Present Illness 57-year-old white female with history of coronary disease hypertension hyperlipidemia diabetes presents to my office for follow-up.  Patient is currently stable without any symptoms of chest pain or shortness of breath at rest on exertion.  No complains any PND orthopnea.  No palpitation dizziness syncope she has some swelling of the feet but she is taking her medicines regularly she does not smoke.    The following portions of the patient's history were reviewed and updated as appropriate: allergies, current medications, past family history, past medical history, past social history, past surgical history and problem list.  Past Medical History:   Diagnosis Date   • CAD (coronary artery disease)    • Diabetes mellitus (HCC)    • Diabetes mellitus type I (HCC)    • GERD (gastroesophageal reflux disease)    • Hyperlipidemia    • Hypertension    • Myocardial infarction (HCC) 2011   • Vitamin D deficiency      Past Surgical History:   Procedure Laterality Date   • BLADDER SURGERY  2011    bladder mesh   • CHOLECYSTECTOMY     • COLONOSCOPY     • ENDOSCOPY     • EYE SURGERY  2018   • HYSTERECTOMY  2009     /61   Pulse 62   Ht 157.5 cm (62\")   Wt 90.7 kg (200 lb)   SpO2 97%   BMI 36.58 kg/m²   Family History   Problem Relation Age of Onset   • Diabetes Mother    • Hypertension Mother    • Diabetes Father    • Hypertension Father    • Stroke Father    • Diabetes Paternal Grandmother    • Diabetes Paternal Grandfather    • Diabetes Other         aunt   • Cancer Other         brain cancer - uncle   • Cancer Other         breast cancer - aunt   • No Known Problems Sister    • No Known Problems Brother    • No Known Problems Maternal Aunt    • No Known Problems Maternal Uncle    • No Known Problems Paternal Aunt    • No Known Problems Paternal Uncle    • No Known " Problems Maternal Grandmother    • No Known Problems Maternal Grandfather    • Anemia Neg Hx    • Arrhythmia Neg Hx    • Asthma Neg Hx    • Clotting disorder Neg Hx    • Fainting Neg Hx    • Heart attack Neg Hx    • Heart disease Neg Hx    • Heart failure Neg Hx    • Hyperlipidemia Neg Hx        Current Outpatient Medications:   •  acetaminophen-codeine (TYLENOL #3) 300-30 MG per tablet, Take 1 tablet by mouth 2 (Two) Times a Day., Disp: , Rfl:   •  aspirin 81 MG tablet, Take 1 tablet by mouth Daily., Disp: , Rfl:   •  azelastine (OPTIVAR) 0.05 % ophthalmic solution, azelastine 0.05 % eye drops, Disp: , Rfl:   •  Biotin 5000 MCG sublingual tablet, , Disp: , Rfl:   •  Blood Glucose Monitoring Suppl (FREESTYLE LITE) device, FREESTYLE LITE VERENICE, Disp: , Rfl:   •  celecoxib (CeleBREX) 100 MG capsule, TAKE 1 CAPSULE BY MOUTH TWICE DAILY AS NEEDED WITH FOOD FOR ARTHRITIS, Disp: , Rfl:   •  cetirizine (zyrTEC) 10 MG tablet, Take 1 tablet by mouth Daily., Disp: , Rfl:   •  colesevelam (WELCHOL) 625 MG tablet, Take 625 mg by mouth 2 (Two) Times a Day Before Meals., Disp: , Rfl:   •  EPINEPHrine (EPIPEN) 0.3 MG/0.3ML solution auto-injector injection, epinephrine 0.3 mg/0.3 mL injection, auto-injector, Disp: , Rfl:   •  FLUoxetine (PROzac) 20 MG tablet, , Disp: , Rfl:   •  gabapentin (NEURONTIN) 300 MG capsule, Take 1 capsule by mouth 3 (Three) Times a Day., Disp: 270 capsule, Rfl: 0  •  glucose blood (Contour Next Test) test strip, USE 1 STRIP TO CHECK GLUCOSE 4 TIMES DAILY, Disp: 400 each, Rfl: 0  •  glycopyrrolate (ROBINUL) 2 MG tablet, Take 1 tablet by mouth 2 (Two) Times a Day., Disp: , Rfl:   •  hydroxychloroquine (PLAQUENIL) 200 MG tablet, Take 1 tablet by mouth Daily., Disp: 90 tablet, Rfl: 1  •  insulin glulisine (Apidra) 100 UNIT/ML injection, USE AS DIRECTED IN INSULIN PUMP. MAX DAILY DOSE 170 UNITS PER DAY (E10.65), Disp: 150 mL, Rfl: 11  •  Lancets (freestyle) lancets, 5 times daily, Disp: 400 each, Rfl: 4  •   lisinopril (PRINIVIL,ZESTRIL) 5 MG tablet, Take 1 tablet by mouth Daily., Disp: , Rfl:   •  metoclopramide (REGLAN) 5 MG tablet, Take 5 mg by mouth 2 (Two) Times a Day., Disp: , Rfl:   •  metoprolol tartrate (LOPRESSOR) 25 MG tablet, Take 1 tablet by mouth twice daily, Disp: 180 tablet, Rfl: 0  •  omeprazole (priLOSEC) 40 MG capsule, Take 40 mg by mouth Daily., Disp: , Rfl: 3  •  simvastatin (ZOCOR) 20 MG tablet, Take 20 mg by mouth Daily., Disp: , Rfl:   •  sorbitol 70 % solution solution, See Admin Instructions., Disp: , Rfl:   •  tiZANidine (ZANAFLEX) 4 MG tablet, , Disp: , Rfl:   •  Continuous Blood Gluc  (Dexcom G6 ) device, 1 Device Continuous. Use for continuous blood glucose monitoring, Disp: 1 each, Rfl: 0  •  Continuous Blood Gluc Sensor (Dexcom G6 Sensor), Use for continuous blood glucose monitoring, replace sensor every 10 days, Disp: 9 each, Rfl: 3  •  Continuous Blood Gluc Transmit (Dexcom G6 Transmitter) misc, 1 Device Continuous. Use for continuous blood glucose monitoring, replace transmitter every 90 days, Disp: 1 each, Rfl: 3  Allergies   Allergen Reactions   • Ciprofloxacin Hives   • Hydrocodone Hives   • Macadamia Nut Oil Anaphylaxis   • Nitrofurantoin Rash   • Nuts Anaphylaxis   • Sulfamethoxazole-Trimethoprim Rash   • Tramadol Hcl Hives   • Black [place] Pollen Allergy Skin Test Hives   • Trimethoprim Unknown - Low Severity     Social History     Socioeconomic History   • Marital status:      Spouse name: Peterson De La Fuente   • Number of children: 3   • Years of education: 12   Tobacco Use   • Smoking status: Never   • Smokeless tobacco: Never   Vaping Use   • Vaping Use: Never used   Substance and Sexual Activity   • Alcohol use: No   • Drug use: No   • Sexual activity: Yes     Partners: Male     Birth control/protection: Surgical     Review of Systems   Constitutional: Negative for malaise/fatigue.   Cardiovascular: Positive for leg swelling. Negative for chest pain,  dyspnea on exertion and palpitations.   Respiratory: Positive for shortness of breath. Negative for cough.    Gastrointestinal: Negative for abdominal pain, nausea and vomiting.   Neurological: Negative for dizziness, focal weakness, headaches, light-headedness and numbness.   All other systems reviewed and are negative.             Objective:     Constitutional:       Appearance: Well-developed.   Eyes:      General: No scleral icterus.     Conjunctiva/sclera: Conjunctivae normal.   HENT:      Head: Normocephalic and atraumatic.   Neck:      Vascular: No carotid bruit or JVD.   Pulmonary:      Effort: Pulmonary effort is normal.      Breath sounds: Normal breath sounds. No wheezing. No rales.   Cardiovascular:      Normal rate. Regular rhythm.   Pulses:     Intact distal pulses.   Abdominal:      General: Bowel sounds are normal.      Palpations: Abdomen is soft.   Musculoskeletal:      Cervical back: Normal range of motion and neck supple. Skin:     General: Skin is warm and dry.      Findings: No rash.   Neurological:      Mental Status: Alert.       Procedures    Lab Review:         MDM clinical 1 coronary disease  Patient had nonobstructive disease with normal LV function the past and is currently stable without any symptoms.  2.  Hypertension  Patient is currently on medical therapy including metoprolol and lisinopril  3.  Hyperlipidemia  Patient is on statins and the lipid levels are well within normal meds  4.  Diabetes  Patient is currently on insulin and followed by the primary care doctor.        Patient's previous medical records, labs, and EKG were reviewed and discussed with the patient at today's visit.

## 2022-11-18 DIAGNOSIS — E10.65 TYPE 1 DIABETES MELLITUS WITH HYPERGLYCEMIA: Primary | ICD-10-CM

## 2022-11-18 RX ORDER — FLASH GLUCOSE SENSOR
KIT MISCELLANEOUS
Qty: 2 EACH | Refills: 5 | Status: SHIPPED | OUTPATIENT
Start: 2022-11-18 | End: 2022-11-18 | Stop reason: RX

## 2022-11-18 RX ORDER — FLASH GLUCOSE SCANNING READER
1 EACH MISCELLANEOUS DAILY
Qty: 1 EACH | Refills: 0 | Status: SHIPPED | OUTPATIENT
Start: 2022-11-18 | End: 2022-11-18 | Stop reason: RX

## 2022-12-19 NOTE — TELEPHONE ENCOUNTER
Rx Refill Note  Requested Prescriptions     Pending Prescriptions Disp Refills   • metoprolol tartrate (LOPRESSOR) 25 MG tablet [Pharmacy Med Name: Metoprolol Tartrate 25 MG Oral Tablet] 180 tablet 0     Sig: Take 1 tablet by mouth twice daily      Last office visit with prescribing clinician: 11/8/2022   Last telemedicine visit with prescribing clinician: Visit date not found   Next office visit with prescribing clinician: f/u in [place]                        Would you like a call back once the refill request has been completed: [] Yes [] No    If the office needs to give you a call back, can they leave a voicemail: [] Yes [] No    Dayanna Gonzalez MA  12/19/22, 09:02 EST

## 2023-01-05 ENCOUNTER — TRANSCRIBE ORDERS (OUTPATIENT)
Dept: ADMINISTRATIVE | Facility: HOSPITAL | Age: 58
End: 2023-01-05
Payer: MEDICARE

## 2023-01-05 DIAGNOSIS — Z12.31 VISIT FOR SCREENING MAMMOGRAM: Primary | ICD-10-CM

## 2023-03-01 DIAGNOSIS — E10.65 TYPE 1 DIABETES MELLITUS WITH HYPERGLYCEMIA: Primary | ICD-10-CM

## 2023-03-01 RX ORDER — INSULIN GLULISINE 100 [IU]/ML
INJECTION, SOLUTION SUBCUTANEOUS
Qty: 150 ML | Refills: 11 | Status: SHIPPED | OUTPATIENT
Start: 2023-03-01

## 2023-03-14 ENCOUNTER — HOSPITAL ENCOUNTER (OUTPATIENT)
Dept: MAMMOGRAPHY | Facility: HOSPITAL | Age: 58
Discharge: HOME | End: 2023-03-14
Admitting: NURSE PRACTITIONER
Payer: MEDICARE

## 2023-03-14 DIAGNOSIS — Z12.31 VISIT FOR SCREENING MAMMOGRAM: ICD-10-CM

## 2023-03-14 PROCEDURE — 77067 SCR MAMMO BI INCL CAD: CPT

## 2023-03-14 PROCEDURE — 77063 BREAST TOMOSYNTHESIS BI: CPT

## 2023-03-17 NOTE — TELEPHONE ENCOUNTER
Rx Refill Note  Requested Prescriptions     Pending Prescriptions Disp Refills   • metoprolol tartrate (LOPRESSOR) 25 MG tablet [Pharmacy Med Name: Metoprolol Tartrate 25 MG Oral Tablet] 180 tablet 0     Sig: Take 1 tablet by mouth twice daily      Last office visit with prescribing clinician: 11/8/2022   Next office visit with prescribing clinician: devin/u in [place] Gonzalez MA  03/17/23, 07:37 EDT

## 2023-04-05 NOTE — TELEPHONE ENCOUNTER
Rx Refill Note  Requested Prescriptions     Pending Prescriptions Disp Refills   • metoprolol tartrate (LOPRESSOR) 25 MG tablet [Pharmacy Med Name: Metoprolol Tartrate 25 MG Oral Tablet] 180 tablet 0     Sig: Take 1 tablet by mouth twice daily      Last office visit with prescribing clinician: 11/8/2022   Last telemedicine visit with prescribing clinician: Visit date not found   Next office visit with prescribing clinician: Visit date not found                         Would you like a call back once the refill request has been completed: [] Yes [] No    If the office needs to give you a call back, can they leave a voicemail: [] Yes [] No    Socorro Larson MA  04/05/23, 07:57 EDT

## 2023-04-12 PROBLEM — E11.3299 NONPROLIFERATIVE DIABETIC RETINOPATHY: Status: ACTIVE | Noted: 2022-06-03

## 2023-04-12 PROBLEM — Z79.899 LONG-TERM USE OF PLAQUENIL: Status: ACTIVE | Noted: 2019-03-06

## 2023-04-12 PROBLEM — E78.5 DYSLIPIDEMIA: Status: ACTIVE | Noted: 2022-06-03

## 2023-04-12 PROBLEM — F32.A DEPRESSIVE DISORDER: Status: ACTIVE | Noted: 2022-06-07

## 2023-04-12 PROBLEM — H10.10 ALLERGIC CONJUNCTIVITIS: Status: ACTIVE | Noted: 2022-07-21

## 2023-04-12 PROBLEM — H26.493 PCO (POSTERIOR CAPSULAR OPACIFICATION), BILATERAL: Status: ACTIVE | Noted: 2021-06-17

## 2023-04-12 PROBLEM — M13.0 POLYARTHROPATHY: Status: ACTIVE | Noted: 2022-06-07

## 2023-04-12 PROBLEM — E10.3293 MILD NONPROLIFERATIVE DIABETIC RETINOPATHY OF BOTH EYES WITHOUT MACULAR EDEMA ASSOCIATED WITH TYPE 1 DIABETES MELLITUS: Status: ACTIVE | Noted: 2020-06-25

## 2023-04-12 PROBLEM — H52.4 PRESBYOPIA: Status: ACTIVE | Noted: 2021-06-17

## 2023-04-12 RX ORDER — OMEPRAZOLE 40 MG/1
CAPSULE, DELAYED RELEASE ORAL
COMMUNITY

## 2023-04-12 RX ORDER — GABAPENTIN 300 MG/1
CAPSULE ORAL
COMMUNITY
End: 2023-04-17

## 2023-04-17 ENCOUNTER — TELEMEDICINE (OUTPATIENT)
Dept: ENDOCRINOLOGY | Facility: CLINIC | Age: 58
End: 2023-04-17
Payer: MEDICARE

## 2023-04-17 VITALS — HEIGHT: 62 IN | BODY MASS INDEX: 36.8 KG/M2 | WEIGHT: 200 LBS

## 2023-04-17 DIAGNOSIS — E78.2 MIXED HYPERLIPIDEMIA: ICD-10-CM

## 2023-04-17 DIAGNOSIS — I10 HYPERTENSION, BENIGN: ICD-10-CM

## 2023-04-17 DIAGNOSIS — E10.65 TYPE 1 DIABETES MELLITUS WITH HYPERGLYCEMIA: Primary | ICD-10-CM

## 2023-04-17 PROCEDURE — 99214 OFFICE O/P EST MOD 30 MIN: CPT | Performed by: INTERNAL MEDICINE

## 2023-04-17 NOTE — PROGRESS NOTES
Endocrine Progress Note Outpatient     Patient Care Team:  Nalini Abarca APRN as PCP - General  You have chosen to receive care through a telehealth visit.  Do you consent to use a video/audio connection for your medical care today? Yes    Chief Complaint: Follow-up type 1 diabetes    HPI: 57-year-old female with history of type 1 diabetes since 1997, also history of hypertension, hyperlipidemia and diabetic neuropathy is followed through telehealth.    For type 1 diabetes she is currently on Medtronic 670 G insulin pump.  Her current insulin pump settings are as follows basal rate midnight 2.45 units/h, 4 AM 2.85 units/h, 6 AM 2.95 units/h, 10 AM 2.75 units/h, 3 PM is 2.65 units/h.  Insulin carb ratio midnight 1 unit for each 4.3 g of carbohydrate and 4 PM is 1 unit for each 3.7 g of carbohydrate, insulin sensitive factor is 1 unit for each 10, with a target blood sugar of 100-1 35.  Active insulin is 4 hours.  She is now using freestyle cherelle sensor system and her last 30-day average was 134.  No significant issues with low blood sugars.  She has not required any assistance or hospitalization or emergency room visits since last seen for high or low blood sugar.  She will definitely benefit from a continuous glucose monitoring system.  It has been prescribed, she is working with her insurance to get it approved.    Hypertension: Well-controlled    Hyperlipidemia: On simvastatin and fenofibrate    Diabetic neuropathy: She has right foot drop    Past Medical History:   Diagnosis Date   • CAD (coronary artery disease)    • Diabetes mellitus    • Diabetes mellitus type I    • GERD (gastroesophageal reflux disease)    • Hyperlipidemia    • Hypertension    • Myocardial infarction 2011   • Vitamin D deficiency        Social History     Socioeconomic History   • Marital status:      Spouse name: Peterson De La Fuente   • Number of children: 3   • Years of education: 12   Tobacco Use   • Smoking status: Never   •  Smokeless tobacco: Never   Vaping Use   • Vaping Use: Never used   Substance and Sexual Activity   • Alcohol use: No   • Drug use: No   • Sexual activity: Yes     Partners: Male     Birth control/protection: Surgical       Family History   Problem Relation Age of Onset   • Diabetes Mother    • Hypertension Mother    • Diabetes Father    • Hypertension Father    • Stroke Father    • Diabetes Paternal Grandmother    • Diabetes Paternal Grandfather    • Diabetes Other         aunt   • Cancer Other         brain cancer - uncle   • Cancer Other         breast cancer - aunt   • No Known Problems Sister    • No Known Problems Brother    • No Known Problems Maternal Aunt    • No Known Problems Maternal Uncle    • No Known Problems Paternal Aunt    • No Known Problems Paternal Uncle    • No Known Problems Maternal Grandmother    • No Known Problems Maternal Grandfather    • Anemia Neg Hx    • Arrhythmia Neg Hx    • Asthma Neg Hx    • Clotting disorder Neg Hx    • Fainting Neg Hx    • Heart attack Neg Hx    • Heart disease Neg Hx    • Heart failure Neg Hx    • Hyperlipidemia Neg Hx        Allergies   Allergen Reactions   • Ciprofloxacin Hives   • Hydrocodone Hives   • Macadamia Nut Oil Anaphylaxis   • Nitrofurantoin Rash   • Nuts Anaphylaxis   • Sulfamethoxazole-Trimethoprim Rash   • Tramadol Hcl Hives   • Black [place] Pollen Allergy Skin Test Hives   • Trimethoprim Unknown - Low Severity       ROS:   Constitutional:  Admit fatigue, tiredness.    Eyes:  Denies change in visual acuity   HENT:  Denies nasal congestion or sore throat   Respiratory: denies cough, shortness of breath.   Cardiovascular:  denies chest pain, edema   GI:  Denies abdominal pain, nausea, vomiting.   Musculoskeletal:  Denies back pain or joint pain   Integument:  Denies dry skin and rash   Neurologic:  Denies headache, focal weakness or sensory changes   Endocrine:  Denies polyuria or polydipsia   Psychiatric:  Denies depression or anxiety      There  were no vitals filed for this visit.    Physical Exam:  GEN: NAD, conversant  Feet: Has callus on left sole, no ulcer seen    Results Review:     I reviewed the patient's new clinical results.    Labs from August 25, 2022 showed a sodium 137, potassium 4.8, chloride 100, CO2 30, glucose 140, BUN 26, creatinine 1.1, AST 29, ALT 24  LDL 72, triglycerides 179, A1c 7.9%.    Medication Review: Reviewed.       Current Outpatient Medications:   •  acetaminophen-codeine (TYLENOL #3) 300-30 MG per tablet, Take 1 tablet by mouth 2 (Two) Times a Day., Disp: , Rfl:   •  aspirin 81 MG tablet, Take 1 tablet by mouth Daily., Disp: , Rfl:   •  azelastine (OPTIVAR) 0.05 % ophthalmic solution, azelastine 0.05 % eye drops, Disp: , Rfl:   •  Biotin 5000 MCG sublingual tablet, , Disp: , Rfl:   •  Blood Glucose Monitoring Suppl (FREESTYLE LITE) device, FREESTYLE LITE VERENICE, Disp: , Rfl:   •  celecoxib (CeleBREX) 100 MG capsule, TAKE 1 CAPSULE BY MOUTH TWICE DAILY AS NEEDED WITH FOOD FOR ARTHRITIS, Disp: , Rfl:   •  cetirizine (zyrTEC) 10 MG tablet, Take 1 tablet by mouth Daily., Disp: , Rfl:   •  colesevelam (WELCHOL) 625 MG tablet, Take 1 tablet by mouth 2 (Two) Times a Day Before Meals., Disp: , Rfl:   •  Continuous Blood Gluc  (FreeStyle Rehan 2 [place]) device, 1 each Daily., Disp: 1 each, Rfl: 0  •  Continuous Blood Gluc Sensor (FreeStyle Rehan 2 Sensor) misc, 1 each Every 14 (Fourteen) Days., Disp: 2 each, Rfl: 6  •  EPINEPHrine (EPIPEN) 0.3 MG/0.3ML solution auto-injector injection, epinephrine 0.3 mg/0.3 mL injection, auto-injector, Disp: , Rfl:   •  FLUoxetine (PROzac) 20 MG tablet, , Disp: , Rfl:   •  gabapentin (NEURONTIN) 300 MG capsule, Take 1 capsule by mouth 3 (Three) Times a Day., Disp: 270 capsule, Rfl: 0  •  glucose blood (Contour Next Test) test strip, USE 1 STRIP TO CHECK GLUCOSE 4 TIMES DAILY, Disp: 400 each, Rfl: 0  •  glycopyrrolate (ROBINUL) 2 MG tablet, Take 1 tablet by mouth 2 (Two) Times a Day., Disp: ,  Rfl:   •  hydroxychloroquine (PLAQUENIL) 200 MG tablet, Take 1 tablet by mouth Daily., Disp: 90 tablet, Rfl: 1  •  insulin glulisine (Apidra) 100 UNIT/ML injection, USE AS DIRECTED IN INSULIN PUMP. MAX DAILY DOSE 170 UNITS PER DAY (E10.65), Disp: 150 mL, Rfl: 11  •  Lancets (freestyle) lancets, 5 times daily, Disp: 400 each, Rfl: 4  •  lisinopril (PRINIVIL,ZESTRIL) 5 MG tablet, Take 1 tablet by mouth Daily., Disp: , Rfl:   •  metoclopramide (REGLAN) 5 MG tablet, Take 1 tablet by mouth 2 (Two) Times a Day., Disp: , Rfl:   •  metoprolol tartrate (LOPRESSOR) 25 MG tablet, Take 1 tablet by mouth twice daily, Disp: 180 tablet, Rfl: 0  •  omeprazole (priLOSEC) 40 MG capsule, omeprazole Take 1 Capsule (oral) 1 time per day No date recorded capsule,delayed release(DR/EC) 1 time per day oral No set duration recorded No set duration amount recorded active 40 mg, Disp: , Rfl:   •  simvastatin (ZOCOR) 20 MG tablet, Take 1 tablet by mouth Daily., Disp: , Rfl:   •  tiZANidine (ZANAFLEX) 4 MG tablet, , Disp: , Rfl:       Assessment & Plan   1.  Diabetes mellitus type 1 with hyperglycemia: No recent labs, will check labs in the meantime continue current regimen.  She is also using freestyle cherelle sensor system.  She is interested in Mounjaro.  Advised to get the labs and then will decide.  Verbalized understanding.  She is advised to always keep glucose source with her in case of low blood sugar and have Glucagon Emergency Kit ready and make sure sugars about 100 before she drives.  She is also advised to get regular eye exam and flu vaccine.      2.  Hypertension: Well-controlled, continue current medications.    3.  Hyperlipidemia: Well-controlled, continue simvastatin and fenofibrate.    4.  Diabetic neuropathy: Stable.    Assessment and plan from August 29, 2022 reviewed and updated.          Marely Peralta MD FACE.

## 2023-05-16 NOTE — PROGRESS NOTES
Encounter Date:05/17/2023        Patient ID: Faye De La Fuente is a 57 y.o. female.      Chief Complaint:      History of Present Illness  57 years old pleasant woman with hypertension, hyperlipidemia, diabetes,?  Coronary artery disease.  Today she comes in with no specific complaints and denies any chest pain or shortness of breath.  She also denies any previous history of coronary artery disease.  In the past she has had allergies and anaphylactic shock with nuts for which she received epinephrine injection.  She suffered from a cardiac arrest requiring a heart cath which did not show any evidence of obstructive coronary disease.    The following portions of the patient's history were reviewed and updated as appropriate: allergies, current medications, past family history, past medical history, past social history, past surgical history and problem list.    Review of Systems   Constitutional: Negative for fever and malaise/fatigue.   HENT: Negative for congestion and hearing loss.    Eyes: Negative for double vision and visual disturbance.   Cardiovascular: Positive for leg swelling. Negative for chest pain, claudication, dyspnea on exertion and syncope.   Respiratory: Negative for cough and shortness of breath.    Endocrine: Positive for cold intolerance.   Skin: Negative for color change and rash.   Musculoskeletal: Positive for arthritis and joint pain.   Gastrointestinal: Positive for heartburn. Negative for abdominal pain.   Genitourinary: Negative for hematuria.   Neurological: Negative for excessive daytime sleepiness and dizziness.   Psychiatric/Behavioral: Negative for depression. The patient is not nervous/anxious.    All other systems reviewed and are negative.        Current Outpatient Medications:   •  acetaminophen-codeine (TYLENOL #3) 300-30 MG per tablet, Take 1 tablet by mouth 2 (Two) Times a Day., Disp: , Rfl:   •  aspirin 81 MG tablet, Take 1 tablet by mouth Daily., Disp: , Rfl:   •   azelastine (OPTIVAR) 0.05 % ophthalmic solution, As Needed., Disp: , Rfl:   •  Biotin 5000 MCG sublingual tablet, , Disp: , Rfl:   •  Blood Glucose Monitoring Suppl (FREESTYLE LITE) device, FREESTYLE LITE VERENICE, Disp: , Rfl:   •  celecoxib (CeleBREX) 100 MG capsule, TAKE 1 CAPSULE BY MOUTH TWICE DAILY AS NEEDED WITH FOOD FOR ARTHRITIS, Disp: , Rfl:   •  cetirizine (zyrTEC) 10 MG tablet, Take 1 tablet by mouth Daily., Disp: , Rfl:   •  colesevelam (WELCHOL) 625 MG tablet, Take 1 tablet by mouth 2 (Two) Times a Day Before Meals., Disp: , Rfl:   •  Continuous Blood Gluc  (FreeStyle Rehan 2 [place]) device, 1 each Daily., Disp: 1 each, Rfl: 0  •  Continuous Blood Gluc Sensor (FreeStyle Rehan 2 Sensor) misc, 1 each Every 14 (Fourteen) Days., Disp: 2 each, Rfl: 6  •  EPINEPHrine (EPIPEN) 0.3 MG/0.3ML solution auto-injector injection, epinephrine 0.3 mg/0.3 mL injection, auto-injector, Disp: , Rfl:   •  FLUoxetine (PROzac) 20 MG tablet, , Disp: , Rfl:   •  gabapentin (NEURONTIN) 300 MG capsule, Take 1 capsule by mouth 3 (Three) Times a Day., Disp: 270 capsule, Rfl: 0  •  glucose blood (Contour Next Test) test strip, USE 1 STRIP TO CHECK GLUCOSE 4 TIMES DAILY, Disp: 400 each, Rfl: 0  •  glycopyrrolate (ROBINUL) 2 MG tablet, Take 1 tablet by mouth 2 (Two) Times a Day., Disp: , Rfl:   •  hydroxychloroquine (PLAQUENIL) 200 MG tablet, Take 1 tablet by mouth Daily., Disp: 90 tablet, Rfl: 1  •  insulin glulisine (Apidra) 100 UNIT/ML injection, USE AS DIRECTED IN INSULIN PUMP. MAX DAILY DOSE 170 UNITS PER DAY (E10.65), Disp: 150 mL, Rfl: 11  •  Lancets (freestyle) lancets, 5 times daily, Disp: 400 each, Rfl: 4  •  lisinopril (PRINIVIL,ZESTRIL) 5 MG tablet, Take 1 tablet by mouth Daily., Disp: , Rfl:   •  metoprolol tartrate (LOPRESSOR) 25 MG tablet, Take 1 tablet by mouth twice daily, Disp: 180 tablet, Rfl: 0  •  omeprazole (priLOSEC) 40 MG capsule, omeprazole Take 1 Capsule (oral) 1 time per day No date recorded  capsule,delayed release(DR/EC) 1 time per day oral No set duration recorded No set duration amount recorded active 40 mg, Disp: , Rfl:   •  simvastatin (ZOCOR) 20 MG tablet, Take 1 tablet by mouth Daily., Disp: , Rfl:   •  tiZANidine (ZANAFLEX) 4 MG tablet, , Disp: , Rfl:   •  metoclopramide (REGLAN) 5 MG tablet, Take 1 tablet by mouth 2 (Two) Times a Day., Disp: , Rfl:     Current outpatient and discharge medications have been reconciled for the patient.  Reviewed by: Geovanni Pitt MD       Allergies   Allergen Reactions   • Ciprofloxacin Hives   • Hydrocodone Hives   • Macadamia Nut Oil Anaphylaxis   • Nitrofurantoin Rash   • Nuts Anaphylaxis   • Sulfamethoxazole-Trimethoprim Rash   • Tramadol Hcl Hives   • Black [place] Pollen Allergy Skin Test Hives   • Trimethoprim Unknown - Low Severity       Family History   Problem Relation Age of Onset   • Diabetes Mother    • Hypertension Mother    • Diabetes Father    • Hypertension Father    • Stroke Father    • Diabetes Paternal Grandmother    • Diabetes Paternal Grandfather    • Diabetes Other         aunt   • Cancer Other         brain cancer - uncle   • Cancer Other         breast cancer - aunt   • No Known Problems Sister    • No Known Problems Brother    • No Known Problems Maternal Aunt    • No Known Problems Maternal Uncle    • No Known Problems Paternal Aunt    • No Known Problems Paternal Uncle    • No Known Problems Maternal Grandmother    • No Known Problems Maternal Grandfather    • Anemia Neg Hx    • Arrhythmia Neg Hx    • Asthma Neg Hx    • Clotting disorder Neg Hx    • Fainting Neg Hx    • Heart attack Neg Hx    • Heart disease Neg Hx    • Heart failure Neg Hx    • Hyperlipidemia Neg Hx        Past Surgical History:   Procedure Laterality Date   • BLADDER SURGERY  2011    bladder mesh   • CHOLECYSTECTOMY     • COLONOSCOPY     • ENDOSCOPY     • EYE SURGERY  2018   • HYSTERECTOMY  2009       Past Medical History:   Diagnosis Date   • CAD (coronary artery  "disease)    • Diabetes mellitus    • Diabetes mellitus type I    • GERD (gastroesophageal reflux disease)    • Hyperlipidemia    • Hypertension    • Myocardial infarction 2011   • Vitamin D deficiency        Family History   Problem Relation Age of Onset   • Diabetes Mother    • Hypertension Mother    • Diabetes Father    • Hypertension Father    • Stroke Father    • Diabetes Paternal Grandmother    • Diabetes Paternal Grandfather    • Diabetes Other         aunt   • Cancer Other         brain cancer - uncle   • Cancer Other         breast cancer - aunt   • No Known Problems Sister    • No Known Problems Brother    • No Known Problems Maternal Aunt    • No Known Problems Maternal Uncle    • No Known Problems Paternal Aunt    • No Known Problems Paternal Uncle    • No Known Problems Maternal Grandmother    • No Known Problems Maternal Grandfather    • Anemia Neg Hx    • Arrhythmia Neg Hx    • Asthma Neg Hx    • Clotting disorder Neg Hx    • Fainting Neg Hx    • Heart attack Neg Hx    • Heart disease Neg Hx    • Heart failure Neg Hx    • Hyperlipidemia Neg Hx        Social History     Socioeconomic History   • Marital status:      Spouse name: Peterson De La Fuente   • Number of children: 3   • Years of education: 12   Tobacco Use   • Smoking status: Never   • Smokeless tobacco: Never   Vaping Use   • Vaping Use: Never used   Substance and Sexual Activity   • Alcohol use: No   • Drug use: No   • Sexual activity: Yes     Partners: Male     Birth control/protection: Surgical               Objective:       Physical Exam    /64   Pulse 62   Ht 157.5 cm (62\")   Wt 94.8 kg (209 lb)   SpO2 98%   BMI 38.23 kg/m²   The patient is alert, oriented and in no distress.    Vital signs as noted above.    Head and neck revealed no carotid bruits or jugular venous distension.  No thyromegaly or lymphadenopathy is present.    Lungs clear.  No wheezing.  Breath sounds are normal bilaterally.    Heart normal first and second " heart sounds.  No murmur..  No pericardial rub is present.  No gallop is present.    Abdomen soft and nontender.  No organomegaly is present.    Extremities revealed good peripheral pulses without any pedal edema.    Skin warm and dry.    Musculoskeletal system is grossly normal.    CNS grossly normal.           Diagnosis Plan   1. Chronic coronary artery disease        2. Mixed hyperlipidemia        3. Hypertension, benign        4. Type 2 diabetes mellitus treated with insulin        5. Chronic renal impairment, stage 3 (moderate), unspecified whether stage 3a or 3b CKD        6. Class 2 obesity due to excess calories without serious comorbidity with body mass index (BMI) of 38.0 to 38.9 in adult        LAB RESULTS (LAST 7 DAYS)    CBC        BMP        CMP         BNP        TROPONIN        CoAg        Creatinine Clearance  CrCl cannot be calculated (Patient's most recent lab result is older than the maximum 30 days allowed.).    ABG        Radiology  No radiology results for the last day    EKG  Procedures    Stress test      Echocardiogram      Cardiac catheterization  No results found for this or any previous visit.    ECG shows normal sinus rhythm, nonspecific ST abnormalities.  Heart rate 63 bpm, UT interval 118 ms, QRS duration 72 ms, QTc 429 ms      Assessment and Plan       Diagnoses and all orders for this visit:    1. Chronic coronary artery disease (Primary)  She has no evidence of coronary disease per heart cath in 2012.  I provided education regarding risk factors modification, weight loss  2. Mixed hyperlipidemia   Lipid panel showed LDL of 67 down from 72, HDL 49, triglyceride 190 and total cholesterol 155.  Currently on simvastatin  3. Hypertension, benign  Currently on lisinopril and metoprolol.  I will increase lisinopril to 10 mg daily.  Plan to repeat renal function  4. Type 2 diabetes mellitus treated with insulin  Most recent A1c was 7.4 down from 7.9  Continue insulin  Follow-up with  PCP  5. Chronic renal impairment, stage 3 (moderate), unspecified whether stage 3a or 3b CKD  Most recent creatinine is 1.3 with GFR of 5  Avoid nephrotoxic agents  Repeat renal function after increasing the dose of lisinopril  6.  Obesity  Diet, exercise, weight loss discussed with the patient.  I also encouraged her to undergo screening and treatment for sleep apnea

## 2023-05-17 ENCOUNTER — OFFICE VISIT (OUTPATIENT)
Dept: CARDIOLOGY | Facility: CLINIC | Age: 58
End: 2023-05-17
Payer: MEDICARE

## 2023-05-17 VITALS
HEART RATE: 62 BPM | BODY MASS INDEX: 38.46 KG/M2 | SYSTOLIC BLOOD PRESSURE: 125 MMHG | DIASTOLIC BLOOD PRESSURE: 64 MMHG | OXYGEN SATURATION: 98 % | WEIGHT: 209 LBS | HEIGHT: 62 IN

## 2023-05-17 DIAGNOSIS — E10.65 TYPE 1 DIABETES MELLITUS WITH HYPERGLYCEMIA: ICD-10-CM

## 2023-05-17 DIAGNOSIS — E11.9 TYPE 2 DIABETES MELLITUS TREATED WITH INSULIN: ICD-10-CM

## 2023-05-17 DIAGNOSIS — I25.10 CHRONIC CORONARY ARTERY DISEASE: Primary | ICD-10-CM

## 2023-05-17 DIAGNOSIS — I10 HYPERTENSION, BENIGN: ICD-10-CM

## 2023-05-17 DIAGNOSIS — E66.09 CLASS 2 OBESITY DUE TO EXCESS CALORIES WITHOUT SERIOUS COMORBIDITY WITH BODY MASS INDEX (BMI) OF 38.0 TO 38.9 IN ADULT: ICD-10-CM

## 2023-05-17 DIAGNOSIS — E78.2 MIXED HYPERLIPIDEMIA: ICD-10-CM

## 2023-05-17 DIAGNOSIS — Z79.4 TYPE 2 DIABETES MELLITUS TREATED WITH INSULIN: ICD-10-CM

## 2023-05-17 DIAGNOSIS — N18.30 CHRONIC RENAL IMPAIRMENT, STAGE 3 (MODERATE), UNSPECIFIED WHETHER STAGE 3A OR 3B CKD: ICD-10-CM

## 2023-05-17 RX ORDER — LISINOPRIL 10 MG/1
10 TABLET ORAL DAILY
Qty: 90 TABLET | Refills: 3 | Status: SHIPPED | OUTPATIENT
Start: 2023-05-17

## 2023-06-15 ENCOUNTER — TELEPHONE (OUTPATIENT)
Dept: ENDOCRINOLOGY | Facility: CLINIC | Age: 58
End: 2023-06-15
Payer: MEDICARE

## 2023-06-15 NOTE — TELEPHONE ENCOUNTER
DWO for Infusion Supplies from [facility] In Dr Peralta's inbox for signature.  Will fax once signed.

## 2023-06-22 ENCOUNTER — OFFICE (AMBULATORY)
Dept: URBAN - METROPOLITAN AREA CLINIC 64 | Facility: CLINIC | Age: 58
End: 2023-06-22
Payer: COMMERCIAL

## 2023-06-22 VITALS
DIASTOLIC BLOOD PRESSURE: 73 MMHG | WEIGHT: 211 LBS | SYSTOLIC BLOOD PRESSURE: 136 MMHG | HEART RATE: 58 BPM | HEIGHT: 62 IN

## 2023-06-22 DIAGNOSIS — R10.30 LOWER ABDOMINAL PAIN, UNSPECIFIED: ICD-10-CM

## 2023-06-22 DIAGNOSIS — R13.10 DYSPHAGIA, UNSPECIFIED: ICD-10-CM

## 2023-06-22 DIAGNOSIS — R11.0 NAUSEA: ICD-10-CM

## 2023-06-22 DIAGNOSIS — R19.7 DIARRHEA, UNSPECIFIED: ICD-10-CM

## 2023-06-22 PROCEDURE — 99214 OFFICE O/P EST MOD 30 MIN: CPT | Performed by: NURSE PRACTITIONER

## 2023-06-22 RX ORDER — OMEPRAZOLE 40 MG/1
CAPSULE, DELAYED RELEASE ORAL
Qty: 90 | Refills: 1 | Status: ACTIVE

## 2023-06-22 RX ORDER — HYOSCYAMINE SULFATE EXTENDED-RELEASE 0.38 MG/1
0.75 TABLET ORAL
Qty: 60 | Refills: 11 | Status: COMPLETED
Start: 2023-06-22 | End: 2023-09-25

## 2023-09-08 NOTE — TELEPHONE ENCOUNTER
Rx Refill Note  Requested Prescriptions     Pending Prescriptions Disp Refills    metoprolol tartrate (LOPRESSOR) 25 MG tablet [Pharmacy Med Name: Metoprolol Tartrate 25 MG Oral Tablet] 180 tablet 0     Sig: Take 1 tablet by mouth twice daily      Last office visit with prescribing clinician: 11/8/2022   Last telemedicine visit with prescribing clinician: Visit date not found   Next office visit with prescribing clinician: Visit date not found                         Would you like a call back once the refill request has been completed: [] Yes [] No    If the office needs to give you a call back, can they leave a voicemail: [] Yes [] No    Donte España MA  09/08/23, 09:26 EDT

## 2023-09-12 ENCOUNTER — ON CAMPUS - OUTPATIENT (AMBULATORY)
Dept: URBAN - METROPOLITAN AREA HOSPITAL 2 | Facility: HOSPITAL | Age: 58
End: 2023-09-12
Payer: COMMERCIAL

## 2023-09-12 ENCOUNTER — OFFICE (AMBULATORY)
Dept: URBAN - METROPOLITAN AREA PATHOLOGY 4 | Facility: PATHOLOGY | Age: 58
End: 2023-09-12
Payer: COMMERCIAL

## 2023-09-12 VITALS
SYSTOLIC BLOOD PRESSURE: 175 MMHG | SYSTOLIC BLOOD PRESSURE: 159 MMHG | HEART RATE: 62 BPM | HEART RATE: 81 BPM | DIASTOLIC BLOOD PRESSURE: 77 MMHG | OXYGEN SATURATION: 98 % | SYSTOLIC BLOOD PRESSURE: 125 MMHG | RESPIRATION RATE: 15 BRPM | HEIGHT: 62 IN | DIASTOLIC BLOOD PRESSURE: 81 MMHG | HEART RATE: 88 BPM | DIASTOLIC BLOOD PRESSURE: 76 MMHG | RESPIRATION RATE: 16 BRPM | HEART RATE: 78 BPM | DIASTOLIC BLOOD PRESSURE: 67 MMHG | SYSTOLIC BLOOD PRESSURE: 164 MMHG | DIASTOLIC BLOOD PRESSURE: 54 MMHG | SYSTOLIC BLOOD PRESSURE: 152 MMHG | SYSTOLIC BLOOD PRESSURE: 113 MMHG | SYSTOLIC BLOOD PRESSURE: 144 MMHG | TEMPERATURE: 95.4 F | OXYGEN SATURATION: 99 % | HEART RATE: 83 BPM | DIASTOLIC BLOOD PRESSURE: 60 MMHG | HEART RATE: 92 BPM | WEIGHT: 213 LBS | RESPIRATION RATE: 18 BRPM | OXYGEN SATURATION: 97 % | DIASTOLIC BLOOD PRESSURE: 59 MMHG | HEART RATE: 82 BPM | DIASTOLIC BLOOD PRESSURE: 83 MMHG

## 2023-09-12 DIAGNOSIS — K29.50 UNSPECIFIED CHRONIC GASTRITIS WITHOUT BLEEDING: ICD-10-CM

## 2023-09-12 DIAGNOSIS — K21.9 GASTRO-ESOPHAGEAL REFLUX DISEASE WITHOUT ESOPHAGITIS: ICD-10-CM

## 2023-09-12 DIAGNOSIS — K29.30 CHRONIC SUPERFICIAL GASTRITIS WITHOUT BLEEDING: ICD-10-CM

## 2023-09-12 DIAGNOSIS — R13.10 DYSPHAGIA, UNSPECIFIED: ICD-10-CM

## 2023-09-12 PROBLEM — K29.70 GASTRITIS, UNSPECIFIED, WITHOUT BLEEDING: Status: ACTIVE | Noted: 2023-09-12

## 2023-09-12 LAB
GI HISTOLOGY: A. SELECT: (no result)
GI HISTOLOGY: PDF REPORT: (no result)

## 2023-09-12 PROCEDURE — 43239 EGD BIOPSY SINGLE/MULTIPLE: CPT | Performed by: INTERNAL MEDICINE

## 2023-09-12 PROCEDURE — 43450 DILATE ESOPHAGUS 1/MULT PASS: CPT | Performed by: INTERNAL MEDICINE

## 2023-09-12 PROCEDURE — 88305 TISSUE EXAM BY PATHOLOGIST: CPT | Mod: 26 | Performed by: INTERNAL MEDICINE

## 2023-09-25 ENCOUNTER — OFFICE (AMBULATORY)
Dept: URBAN - METROPOLITAN AREA CLINIC 64 | Facility: CLINIC | Age: 58
End: 2023-09-25
Payer: COMMERCIAL

## 2023-09-25 ENCOUNTER — TRANSCRIBE ORDERS (OUTPATIENT)
Dept: ADMINISTRATIVE | Facility: HOSPITAL | Age: 58
End: 2023-09-25
Payer: MEDICARE

## 2023-09-25 VITALS
DIASTOLIC BLOOD PRESSURE: 64 MMHG | HEART RATE: 57 BPM | SYSTOLIC BLOOD PRESSURE: 129 MMHG | WEIGHT: 215 LBS | HEIGHT: 62 IN

## 2023-09-25 DIAGNOSIS — R11.0 NAUSEA: ICD-10-CM

## 2023-09-25 DIAGNOSIS — K21.9 GASTROESOPHAGEAL REFLUX DISEASE, UNSPECIFIED WHETHER ESOPHAGITIS PRESENT: Primary | ICD-10-CM

## 2023-09-25 DIAGNOSIS — R13.10 DYSPHAGIA, UNSPECIFIED: ICD-10-CM

## 2023-09-25 DIAGNOSIS — R10.30 LOWER ABDOMINAL PAIN, UNSPECIFIED: ICD-10-CM

## 2023-09-25 DIAGNOSIS — K21.9 GASTRO-ESOPHAGEAL REFLUX DISEASE WITHOUT ESOPHAGITIS: ICD-10-CM

## 2023-09-25 PROCEDURE — 99214 OFFICE O/P EST MOD 30 MIN: CPT | Performed by: NURSE PRACTITIONER

## 2023-10-07 DIAGNOSIS — E10.65 TYPE 1 DIABETES MELLITUS WITH HYPERGLYCEMIA: ICD-10-CM

## 2023-10-13 ENCOUNTER — HOSPITAL ENCOUNTER (OUTPATIENT)
Dept: NUCLEAR MEDICINE | Facility: HOSPITAL | Age: 58
Discharge: HOME | End: 2023-10-13
Payer: MEDICARE

## 2023-10-13 DIAGNOSIS — K21.9 GASTROESOPHAGEAL REFLUX DISEASE, UNSPECIFIED WHETHER ESOPHAGITIS PRESENT: ICD-10-CM

## 2023-10-13 PROCEDURE — A9541 TC99M SULFUR COLLOID: HCPCS | Performed by: NURSE PRACTITIONER

## 2023-10-13 PROCEDURE — 0 TECHNETIUM SULFUR COLLOID: Performed by: NURSE PRACTITIONER

## 2023-10-13 PROCEDURE — 78264 GASTRIC EMPTYING IMG STUDY: CPT

## 2023-10-13 RX ADMIN — TECHNETIUM TC 99M SULFUR COLLOID 1 DOSE: KIT at 07:47

## 2023-10-29 NOTE — PROGRESS NOTES
Encounter Date:11/01/2023        Patient ID: Faye De La Fuente is a 58 y.o. female.      Chief Complaint:      History of Present Illness  58 years old pleasant woman with hypertension, hyperlipidemia, diabetes,?  Coronary artery disease.  Today she comes in with no specific complaints and denies any chest pain. She does report some shortness of breath and bilateral leg edema.  She also denies any previous history of coronary artery disease.  In the past she has had allergies and anaphylactic shock with nuts for which she received epinephrine injection.  She suffered from a cardiac arrest requiring a heart cath which did not show any evidence of obstructive coronary disease.  \  ECG shows normal sinus rhythm with nonspecific ST-T wave changes.  Heart rate 63, [place] interval 138 ms, QRS duration 72 ms and QTc 437 ms.    The following portions of the patient's history were reviewed and updated as appropriate: allergies, current medications, past family history, past medical history, past social history, past surgical history, and problem list.    Review of Systems   Constitutional: Negative for malaise/fatigue.   Cardiovascular:  Negative for chest pain, dyspnea on exertion, leg swelling and palpitations.   Respiratory:  Negative for cough and shortness of breath.    Gastrointestinal:  Negative for abdominal pain, nausea and vomiting.   Neurological:  Positive for dizziness and light-headedness. Negative for focal weakness, headaches and numbness.   All other systems reviewed and are negative.        Current Outpatient Medications:     acetaminophen-codeine (TYLENOL #3) 300-30 MG per tablet, Take 1 tablet by mouth 2 (Two) Times a Day., Disp: , Rfl:     aspirin 81 MG tablet, Take 1 tablet by mouth Daily., Disp: , Rfl:     azelastine (OPTIVAR) 0.05 % ophthalmic solution, As Needed., Disp: , Rfl:     Biotin 5000 MCG sublingual tablet, , Disp: , Rfl:     Blood Glucose Monitoring Suppl (FREESTYLE LITE) device, FREESTYLE  LITE VERENICE, Disp: , Rfl:     celecoxib (CeleBREX) 100 MG capsule, TAKE 1 CAPSULE BY MOUTH TWICE DAILY AS NEEDED WITH FOOD FOR ARTHRITIS, Disp: , Rfl:     cetirizine (zyrTEC) 10 MG tablet, Take 1 tablet by mouth Daily., Disp: , Rfl:     colesevelam (WELCHOL) 625 MG tablet, Take 1 tablet by mouth 2 (Two) Times a Day Before Meals., Disp: , Rfl:     Continuous Blood Gluc  (FreeStyle Rehan 2 [place]) device, 1  ONCE DAILY, Disp: 1 each, Rfl: 0    Continuous Blood Gluc Sensor (FreeStyle [place]han 2 Sensor) [facility] [place] USE ONE SENSOR EVERY 14 DAYS, Disp: 2 each, Rfl: 4    EPINEPHrine (EPIPEN) 0.3 MG/0.3ML solution auto-injector injection, epinephrine 0.3 mg/0.3 mL injection, auto-injector, Disp: , Rfl:     FLUoxetine (PROzac) 20 MG tablet, , Disp: , Rfl:     gabapentin (NEURONTIN) 300 MG capsule, Take 1 capsule by mouth 3 (Three) Times a Day., Disp: 270 capsule, Rfl: 0    glucose blood (Contour Next Test) test strip, USE 1 STRIP TO CHECK GLUCOSE 4 TIMES DAILY, Disp: 400 each, Rfl: 0    glycopyrrolate (ROBINUL) 2 MG tablet, Take 1 tablet by mouth 2 (Two) Times a Day., Disp: , Rfl:     hydroxychloroquine (PLAQUENIL) 200 MG tablet, Take 1 tablet by mouth Daily., Disp: 90 tablet, Rfl: 1    insulin glulisine (Apidra) 100 UNIT/ML injection, USE AS DIRECTED IN INSULIN PUMP. MAX DAILY DOSE 170 UNITS PER DAY (E10.65), Disp: 150 mL, Rfl: 11    Lancets (freestyle) lancets, 5 times daily, Disp: 400 each, Rfl: 4    lisinopril (PRINIVIL,ZESTRIL) 10 MG tablet, Take 1 tablet by mouth Daily., Disp: 90 tablet, Rfl: 3    metoprolol tartrate (LOPRESSOR) 25 MG tablet, Take 1 tablet by mouth twice daily, Disp: 180 tablet, Rfl: 0    omeprazole (priLOSEC) 40 MG capsule, omeprazole Take 1 Capsule (oral) 1 time per day No date recorded capsule,delayed release(DR/EC) 1 time per day oral No set duration recorded No set duration amount recorded active 40 mg, Disp: , Rfl:     simvastatin (ZOCOR) 20 MG tablet, Take 1 tablet by mouth Daily., Disp: , Rfl:      tiZANidine (ZANAFLEX) 4 MG tablet, , Disp: , Rfl:     Current outpatient and discharge medications have been reconciled for the patient.  Reviewed by: Geovanni Pitt MD       Allergies   Allergen Reactions    Ciprofloxacin Hives    Hydrocodone Hives    Macadamia Nut Oil Anaphylaxis    Nitrofurantoin Rash    Nuts Anaphylaxis    Sulfamethoxazole-Trimethoprim Rash    Tramadol Hcl Hives    Black [place] Pollen Allergy Skin Test Hives    Trimethoprim Unknown - Low Severity       Family History   Problem Relation Age of Onset    Diabetes Mother     Hypertension Mother     Diabetes Father     Hypertension Father     Stroke Father     Diabetes Paternal Grandmother     Diabetes Paternal Grandfather     Diabetes Other         aunt    Cancer Other         brain cancer - uncle    Cancer Other         breast cancer - aunt    No Known Problems Sister     No Known Problems Brother     No Known Problems Maternal Aunt     No Known Problems Maternal Uncle     No Known Problems Paternal Aunt     No Known Problems Paternal Uncle     No Known Problems Maternal Grandmother     No Known Problems Maternal Grandfather     Anemia Neg Hx     Arrhythmia Neg Hx     Asthma Neg Hx     Clotting disorder Neg Hx     Fainting Neg Hx     Heart attack Neg Hx     Heart disease Neg Hx     Heart failure Neg Hx     Hyperlipidemia Neg Hx        Past Surgical History:   Procedure Laterality Date    BLADDER SURGERY  2011    bladder mesh    CHOLECYSTECTOMY      COLONOSCOPY      ENDOSCOPY      EYE SURGERY  2018    HYSTERECTOMY  2009       Past Medical History:   Diagnosis Date    CAD (coronary artery disease)     Diabetes mellitus     Diabetes mellitus type I     GERD (gastroesophageal reflux disease)     Hyperlipidemia     Hypertension     Myocardial infarction 2011    Vitamin D deficiency        Family History   Problem Relation Age of Onset    Diabetes Mother     Hypertension Mother     Diabetes Father     Hypertension Father     Stroke Father     Diabetes  "Paternal Grandmother     Diabetes Paternal Grandfather     Diabetes Other         aunt    Cancer Other         brain cancer - uncle    Cancer Other         breast cancer - aunt    No Known Problems Sister     No Known Problems Brother     No Known Problems Maternal Aunt     No Known Problems Maternal Uncle     No Known Problems Paternal Aunt     No Known Problems Paternal Uncle     No Known Problems Maternal Grandmother     No Known Problems Maternal Grandfather     Anemia Neg Hx     Arrhythmia Neg Hx     Asthma Neg Hx     Clotting disorder Neg Hx     Fainting Neg Hx     Heart attack Neg Hx     Heart disease Neg Hx     Heart failure Neg Hx     Hyperlipidemia Neg Hx        Social History     Socioeconomic History    Marital status:      Spouse name: Peterson De La Fuente    Number of children: 3    Years of education: 12   Tobacco Use    Smoking status: Never     Passive exposure: Never    Smokeless tobacco: Never   Vaping Use    Vaping Use: Never used   Substance and Sexual Activity    Alcohol use: No    Drug use: No    Sexual activity: Yes     Partners: Male     Birth control/protection: Hysterectomy, Surgical               Objective:       Physical Exam    /61 (BP Location: Right arm, Patient Position: Sitting, Cuff Size: Adult)   Pulse 63   Ht 157.5 cm (62\")   Wt 98.4 kg (217 lb)   SpO2 96%   BMI 39.69 kg/m²   The patient is alert, oriented and in no distress.    Vital signs as noted above.    Head and neck revealed no carotid bruits or jugular venous distension.  No thyromegaly or lymphadenopathy is present.    Lungs clear.  No wheezing.  Breath sounds are normal bilaterally.    Heart normal first and second heart sounds.  No murmur..  No pericardial rub is present.  No gallop is present.    Abdomen soft and nontender.  No organomegaly is present.    Extremities revealed good peripheral pulses without 1+ pretibial edema.    Skin warm and dry.    Musculoskeletal system is grossly normal.    CNS " grossly normal.           Diagnosis Plan   1. Chronic coronary artery disease        2. Mixed hyperlipidemia        3. Hypertension, benign        4. Type 2 diabetes mellitus treated with insulin        5. Chronic renal impairment, stage 3 (moderate), unspecified whether stage 3a or 3b CKD        6. Class 2 obesity due to excess calories without serious comorbidity with body mass index (BMI) of 38.0 to 38.9 in adult        LAB RESULTS (LAST 7 DAYS)    CBC        BMP        CMP         BNP        TROPONIN        CoAg        Creatinine Clearance  CrCl cannot be calculated (Patient's most recent lab result is older than the maximum 30 days allowed.).    ABG        Radiology  No radiology results for the last day    EKG  Procedures    Stress test      Echocardiogram      Cardiac catheterization  No results found for this or any previous visit.          Assessment and Plan       Diagnoses and all orders for this visit:    1. Chronic coronary artery disease (Primary)    2. Mixed hyperlipidemia    3. Hypertension, benign    4. Type 2 diabetes mellitus treated with insulin    5. Chronic renal impairment, stage 3 (moderate), unspecified whether stage 3a or 3b CKD    6. Class 2 obesity due to excess calories without serious comorbidity with body mass index (BMI) of 38.0 to 38.9 in adult       1. Chronic coronary artery disease (Primary)  She has no evidence of coronary disease per heart cath in 2012.  I provided education regarding risk factors modification, weight loss  2. Mixed hyperlipidemia   Lipid panel showed LDL of 67 down from 72, HDL 49, triglyceride 190 and total cholesterol 155.  Currently on simvastatin  3. Hypertension, benign  Well-controlled on lisinopril and metoprolol  Plan to repeat renal function. She will have labs drawn in January 2024  4. Type 2 diabetes mellitus treated with insulin  Most recent A1c was 7.4 down from 7.9  Continue insulin  Follow-up with PCP  Antoine A1c in January 2024  5. Chronic renal  impairment, stage 3 (moderate), unspecified whether stage 3a or 3b CKD  Most recent creatinine is 1.3 with GFR of 5  Avoid nephrotoxic agents  Repeat renal function   6.  Obesity  Diet, exercise, weight loss discussed with the patient.  I also encouraged her to undergo screening and treatment for sleep apnea  7. Leg edema  She complains of leg edema and some shortness of breath.  I will obtain an echocardiogram.

## 2023-11-01 ENCOUNTER — OFFICE VISIT (OUTPATIENT)
Dept: CARDIOLOGY | Facility: CLINIC | Age: 58
End: 2023-11-01
Payer: MEDICARE

## 2023-11-01 VITALS
BODY MASS INDEX: 39.93 KG/M2 | HEART RATE: 63 BPM | HEIGHT: 62 IN | WEIGHT: 217 LBS | DIASTOLIC BLOOD PRESSURE: 61 MMHG | OXYGEN SATURATION: 96 % | SYSTOLIC BLOOD PRESSURE: 131 MMHG

## 2023-11-01 DIAGNOSIS — E11.9 TYPE 2 DIABETES MELLITUS TREATED WITH INSULIN: ICD-10-CM

## 2023-11-01 DIAGNOSIS — E66.09 CLASS 2 OBESITY DUE TO EXCESS CALORIES WITHOUT SERIOUS COMORBIDITY WITH BODY MASS INDEX (BMI) OF 38.0 TO 38.9 IN ADULT: ICD-10-CM

## 2023-11-01 DIAGNOSIS — R60.0 BILATERAL LEG EDEMA: ICD-10-CM

## 2023-11-01 DIAGNOSIS — E78.2 MIXED HYPERLIPIDEMIA: ICD-10-CM

## 2023-11-01 DIAGNOSIS — I10 HYPERTENSION, BENIGN: ICD-10-CM

## 2023-11-01 DIAGNOSIS — I25.10 CHRONIC CORONARY ARTERY DISEASE: Primary | ICD-10-CM

## 2023-11-01 DIAGNOSIS — N18.30 CHRONIC RENAL IMPAIRMENT, STAGE 3 (MODERATE), UNSPECIFIED WHETHER STAGE 3A OR 3B CKD: ICD-10-CM

## 2023-11-01 DIAGNOSIS — Z79.4 TYPE 2 DIABETES MELLITUS TREATED WITH INSULIN: ICD-10-CM

## 2023-11-15 ENCOUNTER — OUTSIDE FACILITY SERVICE (OUTPATIENT)
Dept: CARDIOLOGY | Facility: CLINIC | Age: 58
End: 2023-11-15
Payer: MEDICARE

## 2023-11-15 PROCEDURE — 93356 MYOCRD STRAIN IMG SPCKL TRCK: CPT | Performed by: INTERNAL MEDICINE

## 2023-11-15 PROCEDURE — 93306 TTE W/DOPPLER COMPLETE: CPT | Performed by: INTERNAL MEDICINE

## 2023-11-17 ENCOUNTER — OFFICE (AMBULATORY)
Dept: URBAN - METROPOLITAN AREA CLINIC 64 | Facility: CLINIC | Age: 58
End: 2023-11-17
Payer: COMMERCIAL

## 2023-11-17 VITALS
DIASTOLIC BLOOD PRESSURE: 62 MMHG | SYSTOLIC BLOOD PRESSURE: 155 MMHG | WEIGHT: 219 LBS | HEART RATE: 62 BPM | HEIGHT: 62 IN

## 2023-11-17 DIAGNOSIS — K31.84 GASTROPARESIS: ICD-10-CM

## 2023-11-17 DIAGNOSIS — K21.9 GASTRO-ESOPHAGEAL REFLUX DISEASE WITHOUT ESOPHAGITIS: ICD-10-CM

## 2023-11-17 PROCEDURE — 99214 OFFICE O/P EST MOD 30 MIN: CPT | Performed by: NURSE PRACTITIONER

## 2023-11-17 RX ORDER — METOCLOPRAMIDE 5 MG/1
TABLET ORAL
Qty: 60 | Refills: 3 | Status: COMPLETED
Start: 2023-11-17 | End: 2024-07-23

## 2023-11-21 DIAGNOSIS — E10.65 TYPE 1 DIABETES MELLITUS WITH HYPERGLYCEMIA: ICD-10-CM

## 2023-12-15 DIAGNOSIS — E10.65 TYPE 1 DIABETES MELLITUS WITH HYPERGLYCEMIA: Primary | ICD-10-CM

## 2023-12-15 RX ORDER — INSULIN LISPRO 100 [IU]/ML
INJECTION, SOLUTION INTRAVENOUS; SUBCUTANEOUS
Qty: 150 ML | Refills: 6 | Status: SHIPPED | OUTPATIENT
Start: 2023-12-15

## 2023-12-15 NOTE — TELEPHONE ENCOUNTER
Insurance will not cover Apidra but will cover Humalog. Rx sent to provider for review and signature.

## 2023-12-21 DIAGNOSIS — E10.65 TYPE 1 DIABETES MELLITUS WITH HYPERGLYCEMIA: ICD-10-CM

## 2024-01-18 ENCOUNTER — OFFICE VISIT (OUTPATIENT)
Dept: ENDOCRINOLOGY | Facility: CLINIC | Age: 59
End: 2024-01-18
Payer: MEDICARE

## 2024-01-18 VITALS
BODY MASS INDEX: 40.12 KG/M2 | OXYGEN SATURATION: 96 % | WEIGHT: 218 LBS | HEIGHT: 62 IN | DIASTOLIC BLOOD PRESSURE: 75 MMHG | HEART RATE: 69 BPM | SYSTOLIC BLOOD PRESSURE: 125 MMHG

## 2024-01-18 DIAGNOSIS — E10.65 TYPE 1 DIABETES MELLITUS WITH HYPERGLYCEMIA: Primary | ICD-10-CM

## 2024-01-18 DIAGNOSIS — I10 HYPERTENSION, BENIGN: ICD-10-CM

## 2024-01-18 DIAGNOSIS — E11.42 DIABETIC PERIPHERAL NEUROPATHY: ICD-10-CM

## 2024-01-18 DIAGNOSIS — E78.2 MIXED HYPERLIPIDEMIA: ICD-10-CM

## 2024-01-18 PROCEDURE — 99214 OFFICE O/P EST MOD 30 MIN: CPT | Performed by: INTERNAL MEDICINE

## 2024-01-18 PROCEDURE — 3074F SYST BP LT 130 MM HG: CPT | Performed by: INTERNAL MEDICINE

## 2024-01-18 PROCEDURE — 1159F MED LIST DOCD IN RCRD: CPT | Performed by: INTERNAL MEDICINE

## 2024-01-18 PROCEDURE — 1160F RVW MEDS BY RX/DR IN RCRD: CPT | Performed by: INTERNAL MEDICINE

## 2024-01-18 PROCEDURE — 3078F DIAST BP <80 MM HG: CPT | Performed by: INTERNAL MEDICINE

## 2024-01-18 PROCEDURE — 95251 CONT GLUC MNTR ANALYSIS I&R: CPT | Performed by: INTERNAL MEDICINE

## 2024-01-18 RX ORDER — INSULIN PMP CART,AUT,G6/7,CNTR
1 EACH SUBCUTANEOUS DAILY
Qty: 10 EACH | Refills: 6 | Status: SHIPPED | OUTPATIENT
Start: 2024-01-18

## 2024-01-18 RX ORDER — METOCLOPRAMIDE 5 MG/1
5 TABLET ORAL
COMMUNITY
Start: 2024-01-10

## 2024-01-18 RX ORDER — PROCHLORPERAZINE 25 MG/1
1 SUPPOSITORY RECTAL DAILY
Qty: 1 EACH | Refills: 0 | Status: SHIPPED | OUTPATIENT
Start: 2024-01-18

## 2024-01-18 RX ORDER — PROCHLORPERAZINE 25 MG/1
1 SUPPOSITORY RECTAL DAILY
Qty: 1 EACH | Refills: 1 | Status: SHIPPED | OUTPATIENT
Start: 2024-01-18

## 2024-01-18 RX ORDER — PROCHLORPERAZINE 25 MG/1
SUPPOSITORY RECTAL
Qty: 2 EACH | Refills: 6 | Status: SHIPPED | OUTPATIENT
Start: 2024-01-18

## 2024-01-18 RX ORDER — INSULIN PMP CART,AUT,G6/7,CNTR
1 EACH SUBCUTANEOUS DAILY
Qty: 1 KIT | Refills: 0 | Status: SHIPPED | OUTPATIENT
Start: 2024-01-18

## 2024-01-18 NOTE — PATIENT INSTRUCTIONS
Please get your OmniPod 5 pump with Dexcom system to change to a new pump  Always keep glucose source in case of low blood sugars  Start Mounjaro 2.5 mg subcu weekly for 4 weeks and if able to tolerate then increase the dose to 5 mg subcu weekly  Labs before follow-up

## 2024-01-18 NOTE — PROGRESS NOTES
Endocrine Progress Note Outpatient     Patient Care Team:  Nalini Abarca APRN as PCP - General      Chief Complaint: Follow-up type 1 diabetes    HPI: 57-year-old female with history of type 1 diabetes since 1997, also history of hypertension, hyperlipidemia and diabetic neuropathy is here for follow-up.    For type 1 diabetes she is currently on Medtronic 670 G insulin pump.  Her current insulin pump settings are as follows basal rate midnight 2.45 units/h, 4 AM 2.85 units/h, 6 AM 2.95 units/h, 10 AM 2.75 units/h, 3 PM is 2.65 units/h.  Insulin carb ratio midnight 1 unit for each 4.3 g of carbohydrate and 4 PM is 1 unit for each 3.7 g of carbohydrate, insulin sensitive factor is 1 unit for each 10, with a target blood sugar of 100-1 35.  Active insulin is 4 hours.  She is now using freestyle cherelle sensor system and her last 30-day average was 134.  No significant issues with low blood sugars.  She has not required any assistance or hospitalization or emergency room visits since last seen for high or low blood sugar.  She will definitely benefit from a continuous glucose monitoring system.  It has been prescribed, she is working with her insurance to get it approved.    Hypertension: Well-controlled    Hyperlipidemia: On simvastatin and fenofibrate    Diabetic neuropathy: She has right foot drop    Past Medical History:   Diagnosis Date    CAD (coronary artery disease)     Chronic renal insufficiency, stage III (moderate) 01/09/2019    Diabetes mellitus     Diabetes mellitus type I     GERD (gastroesophageal reflux disease)     Hyperlipidemia     Hypertension     Myocardial infarction 2011    Vitamin D deficiency        Social History     Socioeconomic History    Marital status:      Spouse name: Peterson De La Fuente    Number of children: 3    Years of education: 12   Tobacco Use    Smoking status: Never     Passive exposure: Never    Smokeless tobacco: Never   Vaping Use    Vaping Use: Never used   Substance  and Sexual Activity    Alcohol use: No    Drug use: No    Sexual activity: Yes     Partners: Male     Birth control/protection: Hysterectomy, Surgical       Family History   Problem Relation Age of Onset    Diabetes Mother     Hypertension Mother     Diabetes Father     Hypertension Father     Stroke Father     Diabetes Paternal Grandmother     Diabetes Paternal Grandfather     Diabetes Other         aunt    Cancer Other         brain cancer - uncle    Cancer Other         breast cancer - aunt    No Known Problems Sister     No Known Problems Brother     No Known Problems Maternal Aunt     No Known Problems Maternal Uncle     No Known Problems Paternal Aunt     No Known Problems Paternal Uncle     No Known Problems Maternal Grandmother     No Known Problems Maternal Grandfather     Anemia Neg Hx     Arrhythmia Neg Hx     Asthma Neg Hx     Clotting disorder Neg Hx     Fainting Neg Hx     Heart attack Neg Hx     Heart disease Neg Hx     Heart failure Neg Hx     Hyperlipidemia Neg Hx        Allergies   Allergen Reactions    Ciprofloxacin Hives    Hydrocodone Hives    Macadamia Nut Oil Anaphylaxis    Nitrofurantoin Rash    Nuts Anaphylaxis    Sulfamethoxazole-Trimethoprim Rash    Tramadol Hcl Hives    Black [place] Pollen Allergy Skin Test Hives    Trimethoprim Unknown - Low Severity       ROS:   Constitutional:  Admit fatigue, tiredness.    Eyes:  Denies change in visual acuity   HENT:  Denies nasal congestion or sore throat   Respiratory: denies cough, shortness of breath.   Cardiovascular:  denies chest pain, edema   GI:  Denies abdominal pain, nausea, vomiting.   Musculoskeletal:  Denies back pain or joint pain   Integument:  Denies dry skin and rash   Neurologic:  Denies headache, focal weakness or sensory changes   Endocrine:  Denies polyuria or polydipsia   Psychiatric:  Denies depression or anxiety      Vitals:    01/18/24 1419   BP: 125/75   Pulse: 69   SpO2: 96%       Physical Exam:  GEN: NAD,  conversant  CHEST: CTA  CVS: RRR  Feet: Has callus on left sole, no ulcer seen    Results Review:     I reviewed the patient's new clinical results.    Freestyle rehan download interpretation: Dates covered was between January 5, 2024 to January 18, 2024.  Average blood sugar was 118.  She is spending 92% in the range, 2% below range and 6% above range.  She is having some postprandial hyperglycemia.    Labs from August 25, 2022 showed a sodium 137, potassium 4.8, chloride 100, CO2 30, glucose 140, BUN 26, creatinine 1.1, AST 29, ALT 24  LDL 72, triglycerides 179, A1c 7.9%.    Medication Review: Reviewed.       Current Outpatient Medications:     acetaminophen-codeine (TYLENOL #3) 300-30 MG per tablet, Take 1 tablet by mouth 2 (Two) Times a Day., Disp: , Rfl:     aspirin 81 MG tablet, Take 1 tablet by mouth Daily., Disp: , Rfl:     azelastine (OPTIVAR) 0.05 % ophthalmic solution, As Needed., Disp: , Rfl:     Biotin 5000 MCG sublingual tablet, , Disp: , Rfl:     Blood Glucose Monitoring Suppl (FREESTYLE LITE) device, FREESTYLE LITE VERENICE, Disp: , Rfl:     celecoxib (CeleBREX) 100 MG capsule, TAKE 1 CAPSULE BY MOUTH TWICE DAILY AS NEEDED WITH FOOD FOR ARTHRITIS, Disp: , Rfl:     cetirizine (zyrTEC) 10 MG tablet, Take 1 tablet by mouth Daily., Disp: , Rfl:     colesevelam (WELCHOL) 625 MG tablet, Take 1 tablet by mouth 2 (Two) Times a Day Before Meals., Disp: , Rfl:     Continuous Blood Gluc  (FreeStyle Rehan 2 [place]) device, 1  ONCE DAILY, Disp: 1 each, Rfl: 0    Continuous Blood Gluc Sensor (FreeStyle Rehan 2 Sensor) misc, USE 1 EVERY 14 DAYS, Disp: 2 each, Rfl: 2    EPINEPHrine (EPIPEN) 0.3 MG/0.3ML solution auto-injector injection, epinephrine 0.3 mg/0.3 mL injection, auto-injector, Disp: , Rfl:     FLUoxetine (PROzac) 20 MG tablet, , Disp: , Rfl:     gabapentin (NEURONTIN) 300 MG capsule, Take 1 capsule by mouth 3 (Three) Times a Day., Disp: 270 capsule, Rfl: 0    glucose blood (Contour Next Test) test  strip, USE 1 STRIP TO CHECK GLUCOSE 4 TIMES DAILY, Disp: 400 each, Rfl: 0    glycopyrrolate (ROBINUL) 2 MG tablet, Take 1 tablet by mouth 2 (Two) Times a Day., Disp: , Rfl:     hydroxychloroquine (PLAQUENIL) 200 MG tablet, Take 1 tablet by mouth Daily., Disp: 90 tablet, Rfl: 1    Insulin Lispro (HumaLOG) 100 UNIT/ML injection, USE AS DIRECTED IN INSULIN PUMP. MAX DAILY DOSE 170 UNITS PER DAY (E10.65), Disp: 150 mL, Rfl: 6    Lancets (freestyle) lancets, 5 times daily, Disp: 400 each, Rfl: 4    lisinopril (PRINIVIL,ZESTRIL) 10 MG tablet, Take 1 tablet by mouth Daily., Disp: 90 tablet, Rfl: 3    metoclopramide (REGLAN) 5 MG tablet, Take 1 tablet by mouth 2 (Two) Times a Day Before Meals., Disp: , Rfl:     metoprolol tartrate (LOPRESSOR) 25 MG tablet, Take 1 tablet by mouth twice daily, Disp: 180 tablet, Rfl: 1    omeprazole (priLOSEC) 40 MG capsule, omeprazole Take 1 Capsule (oral) 1 time per day No date recorded capsule,delayed release(DR/EC) 1 time per day oral No set duration recorded No set duration amount recorded active 40 mg, Disp: , Rfl:     simvastatin (ZOCOR) 20 MG tablet, Take 1 tablet by mouth Daily., Disp: , Rfl:     tiZANidine (ZANAFLEX) 4 MG tablet, , Disp: , Rfl:       Assessment & Plan   1.  Diabetes mellitus type 1 with hyperglycemia: Uncontrolled with some low blood sugars, I will reduce the basal rate since I will be putting her on Mounjaro.  Basal rates will be as follows at midnight will be 2.25 units/h, at 4 AM midnight will be 2.50 units/h.  Rest of the insulin pump settings will stay as it is.  Will also add Mounjaro 2.5 mg subcu weekly for 4 weeks and if able to tolerate then increase the dose to 5 mg subcu weekly.  Advised to continue to work on diet and activity and always keep glucose source.     2.  Hypertension: Well-controlled, continue current medications.    3.  Hyperlipidemia: Well-controlled, continue simvastatin and fenofibrate.    4.  Diabetic neuropathy: Stable.    Assessment and  plan from April 17, 2023 reviewed and updated.          Marely Peralta MD FACE.

## 2024-01-23 ENCOUNTER — SPECIALTY PHARMACY (OUTPATIENT)
Dept: ENDOCRINOLOGY | Facility: CLINIC | Age: 59
End: 2024-01-23
Payer: MEDICARE

## 2024-01-24 RX ORDER — INSULIN PMP CART,AUT,G6/7,CNTR
1 EACH SUBCUTANEOUS DAILY
Qty: 30 EACH | Refills: 6 | Status: SHIPPED | OUTPATIENT
Start: 2024-01-24

## 2024-01-24 RX ORDER — SEMAGLUTIDE 0.68 MG/ML
INJECTION, SOLUTION SUBCUTANEOUS
Qty: 3 ML | Refills: 6 | Status: SHIPPED | OUTPATIENT
Start: 2024-01-24

## 2024-01-24 NOTE — PROGRESS NOTES
Specialty Pharmacy       Faye De La Fuente is a 58 y.o. female seen by an Endocrinology provider for Type 1 Diabetes.    Benefits Investigation Summary    Prescription: New Therapy- omnipod,dexcom, and GLP-1    Dispensing pharmacy: [facility] pharmacy    Copay amount: dexcom-$0, omnipod $11.20 per month, ozempic $11.20 per month    PLAN: [email or web address]  BIN: [id number]  PCN: [id number]  RX GROUP: [id number]    Prior Auth and Med Assistance notes: Mounjaro required a prior authorization, which was submitted and denied (CMM: (Key: [id number]). Ozempic and trulicity are both covered on the plan with out a PA. Discussed with Dr. Peralta and will switch to ozempic at this time. Ozemic copay os $11.20 per month.   PA submitted for omnipod and dexcom as well and both were approved.  Called to notify patient of the medication approvals and changes and provided patient with education. [facility] and the 24-hour customer care number.    Sent updated prescription for ozempic, to replace mounjaro, and omnipod refills to the pharmacy.    Ozempic® (semaglutide)  Medication Expectations   Why am I taking this medication? You are taking Ozempic, along with diet and exercise, to lower blood sugar because you have type 2 diabetes. Diabetes is not curable but with proper medication and treatment, we can keep your blood sugar within your personalized target range.    What should I expect while on this medication? You should expect to see your blood sugar, A1c and possibly weight decrease over time.   How does the medication work? Ozempic is a non-insulin injection that works with your body to release insulin in response to your blood sugar rising.  This medication also slows down food from leaving your stomach, making you feel longoria for longer.   How long will I be on this medication for? The amount of time you will be on this medication will be determined by your doctor based on blood sugar and A1c control. You  will most likely be on this medication or another diabetes medication throughout your lifetime. Do not abruptly stop this medication without talking to your doctor first.    How do I take this medication? Take as directed on your prescription label. Ozempic is injected under the skin (subcutaneously) of your stomach, thigh or upper arm.  Use this medication once weekly, on the same day each week, and it can be given with or without food.    What are some possible side effects? You may notice you don't feel as hungry, especially when you first start using Ozempic.  The most common side effects are nausea, stomach cramping, and constipation. Stop using Ozempic and call your doctor immediately if you have severe pain in your stomach area that will not go away.    What happens if I miss a dose? If you miss a dose, take it as soon as you remember as long as it is within 5 days after your missed dose.  If more than 5 days have passed, skip the missed dose and resume Ozempic on the regularly scheduled day.     Medication Safety   What are things I should warn my doctor immediately about? Do not use Ozempic if you or a family member have ever had medullary thyroid cancer (MTC) or Multiple Endocrine Neoplasia syndrome type 2 (MEN 2).  Tell your doctor if you have or have had problems with your kidneys or pancreas, or if you are pregnant, planning to become pregnant. Stop using Ozempic and get medical help right away if you have severe pain that will not go away, or if you notice any signs/symptoms of an allergic reaction (rash, hives, difficulty breathing, etc.).    What are things that I should be cautious of? Be cautious of any side effects from this medication. Talk to your doctor if any new ones develop or aren't getting better.    What are some medications that can interact with this one? Taking Ozempic with other medications that also lower your blood sugar such as insulin and glipizide/glimepiride/glyburide may  increase the risk of low blood sugar. Your doctor may reduce the dose of these medications when you start Ozempic.  Always tell your doctor or pharmacist immediately if you start taking any new medications, including over-the-counter medications, vitamins, and herbal supplements.       Medication Storage/Handling   How should I handle this medication? Keep this medication out of reach of pets/children and keep the pen capped    How does this medication need to be stored? Store in the refrigerator prior to first use, but do not freeze.  After first use, you may continue to store in the refrigerator or at room temperature for 56 days.  Protect from excessive heat and sunlight.   How should I dispose of this medication? Used Ozempic pens should be thrown away after 56 days, even if medication remains. If your doctor decides to stop this medication, take to your local police station for proper disposal. Some pharmacies also have take-back bins for medication drop-off.      Resources/Support   How can I remind myself to take this medication? You can download reminder apps to help you manage your refills. You may also set an alarm on your phone to remind you.    Is financial support available?  [employer] can provide co-pay cards if you have commercial insurance or patient assistance if you have Medicare or no insurance.    Which vaccines are recommended for me? Talk to your doctor about these vaccines: Flu, Coronavirus (COVID-19), Pneumococcal (pneumonia), Tdap, Hepatitis B, Zoster (shingles)        Answered all questions and concerns at this time.    Ale Diane, PharmD  Clinical Specialty Pharmacist, Endocrinology  1/24/2024  15:13 EST

## 2024-01-26 ENCOUNTER — TELEPHONE (OUTPATIENT)
Dept: ENDOCRINOLOGY | Facility: CLINIC | Age: 59
End: 2024-01-26
Payer: MEDICARE

## 2024-01-31 ENCOUNTER — OFFICE (AMBULATORY)
Dept: URBAN - METROPOLITAN AREA CLINIC 64 | Facility: CLINIC | Age: 59
End: 2024-01-31
Payer: COMMERCIAL

## 2024-01-31 VITALS
HEIGHT: 62 IN | WEIGHT: 221 LBS | SYSTOLIC BLOOD PRESSURE: 130 MMHG | DIASTOLIC BLOOD PRESSURE: 46 MMHG | HEART RATE: 67 BPM

## 2024-01-31 DIAGNOSIS — K31.84 GASTROPARESIS: ICD-10-CM

## 2024-01-31 DIAGNOSIS — R13.10 DYSPHAGIA, UNSPECIFIED: ICD-10-CM

## 2024-01-31 DIAGNOSIS — K21.9 GASTRO-ESOPHAGEAL REFLUX DISEASE WITHOUT ESOPHAGITIS: ICD-10-CM

## 2024-01-31 PROCEDURE — 99214 OFFICE O/P EST MOD 30 MIN: CPT | Performed by: NURSE PRACTITIONER

## 2024-02-27 RX ORDER — PROCHLORPERAZINE 25 MG/1
SUPPOSITORY RECTAL SEE ADMIN INSTRUCTIONS
Qty: 1 EACH | Refills: 0 | Status: SHIPPED | OUTPATIENT
Start: 2024-02-27

## 2024-03-14 RX ORDER — PROCHLORPERAZINE 25 MG/1
SUPPOSITORY RECTAL SEE ADMIN INSTRUCTIONS
Qty: 1 EACH | Refills: 3 | Status: SHIPPED | OUTPATIENT
Start: 2024-03-14

## 2024-04-28 NOTE — PROGRESS NOTES
Encounter Date:05/01/2024        Patient ID: Faye De La Fuente is a 58 y.o. female.      Chief Complaint:      History of Present Illness  58 years old pleasant woman with hypertension, hyperlipidemia, diabetes,?  Coronary artery disease.  She comes in with no specific complaints and denies any chest pain. She does report some shortness of breath and bilateral leg edema.  She also denies any previous history of coronary artery disease.  In the past she has had allergies and anaphylactic shock with nuts for which she received epinephrine injection.  She suffered from a cardiac arrest requiring a heart cath which did not show any evidence of obstructive coronary disease.    ECG shows normal sinus rhythm.  Heart rate 74, SD interval 126, QRS duration 68 and QTc 428 ms.  This is unchanged when compared to prior ECG.    Current cardiac medicines include aspirin, lisinopril, metoprolol.  She is also on simvastatin and Ozempic  \    The following portions of the patient's history were reviewed and updated as appropriate: allergies, current medications, past family history, past medical history, past social history, past surgical history, and problem list.    Review of Systems   Constitutional: Negative for malaise/fatigue.   Cardiovascular:  Positive for leg swelling. Negative for chest pain, dyspnea on exertion and palpitations.   Respiratory:  Negative for cough and shortness of breath.    Gastrointestinal:  Negative for abdominal pain, nausea and vomiting.   Neurological:  Negative for dizziness, focal weakness, headaches, light-headedness and numbness.   All other systems reviewed and are negative.      Current Outpatient Medications:     acetaminophen-codeine (TYLENOL #3) 300-30 MG per tablet, Take 1 tablet by mouth 2 (Two) Times a Day., Disp: , Rfl:     aspirin 81 MG tablet, Take 1 tablet by mouth Daily., Disp: , Rfl:     azelastine (OPTIVAR) 0.05 % ophthalmic solution, As Needed., Disp: , Rfl:     Biotin 5000  MCG sublingual tablet, , Disp: , Rfl:     Blood Glucose Monitoring Suppl (FREESTYLE LITE) device, FREESTYLE LITE VERENICE, Disp: , Rfl:     celecoxib (CeleBREX) 100 MG capsule, TAKE 1 CAPSULE BY MOUTH TWICE DAILY AS NEEDED WITH FOOD FOR ARTHRITIS, Disp: , Rfl:     cetirizine (zyrTEC) 10 MG tablet, Take 1 tablet by mouth Daily., Disp: , Rfl:     colesevelam (WELCHOL) 625 MG tablet, Take 1 tablet by mouth 2 (Two) Times a Day Before Meals., Disp: , Rfl:     Continuous Blood Gluc  (Dexcom G6 ) device, Use 1 Device Daily., Disp: 1 each, Rfl: 1    Continuous Blood Gluc Sensor (Dexcom G6 Sensor), Use Every 10 (Ten) Days., Disp: 2 each, Rfl: 6    Continuous Blood Gluc Transmit (Dexcom G6 Transmitter) misc, USE DAILY AS DIRECTED, Disp: 1 each, Rfl: 3    EPINEPHrine (EPIPEN) 0.3 MG/0.3ML solution auto-injector injection, epinephrine 0.3 mg/0.3 mL injection, auto-injector, Disp: , Rfl:     FLUoxetine (PROzac) 20 MG tablet, , Disp: , Rfl:     gabapentin (NEURONTIN) 300 MG capsule, Take 1 capsule by mouth 3 (Three) Times a Day., Disp: 270 capsule, Rfl: 0    glucose blood (Contour Next Test) test strip, USE 1 STRIP TO CHECK GLUCOSE 4 TIMES DAILY, Disp: 400 each, Rfl: 0    glycopyrrolate (ROBINUL) 2 MG tablet, Take 1 tablet by mouth 2 (Two) Times a Day., Disp: , Rfl:     hydroxychloroquine (PLAQUENIL) 200 MG tablet, Take 1 tablet by mouth Daily., Disp: 90 tablet, Rfl: 1    Insulin Disposable Pump (Omnipod 5 G6 Intro, Gen 5,) kit, Use 1 package Daily., Disp: 1 kit, Rfl: 0    Insulin Disposable Pump (Omnipod 5 G6 Pod, Gen 5,) misc, Use 1 package Daily. Change pod as directed daily based on insulin dose, Disp: 30 each, Rfl: 6    Insulin Lispro (HumaLOG) 100 UNIT/ML injection, USE AS DIRECTED IN INSULIN PUMP. MAX DAILY DOSE 170 UNITS PER DAY (E10.65), Disp: 150 mL, Rfl: 6    Lancets (freestyle) lancets, 5 times daily, Disp: 400 each, Rfl: 4    lisinopril (PRINIVIL,ZESTRIL) 10 MG tablet, Take 1 tablet by mouth Daily., Disp:  90 tablet, Rfl: 3    metoclopramide (REGLAN) 5 MG tablet, Take 1 tablet by mouth 2 (Two) Times a Day Before Meals., Disp: , Rfl:     metoprolol tartrate (LOPRESSOR) 25 MG tablet, Take 1 tablet by mouth twice daily, Disp: 180 tablet, Rfl: 1    omeprazole (priLOSEC) 40 MG capsule, omeprazole Take 1 Capsule (oral) 1 time per day No date recorded capsule,delayed release(DR/EC) 1 time per day oral No set duration recorded No set duration amount recorded active 40 mg, Disp: , Rfl:     Semaglutide,0.25 or 0.5MG/DOS, (Ozempic, 0.25 or 0.5 MG/DOSE,) 2 MG/3ML solution pen-injector, Inject 0.25 mg under the skin once weekly for 4 weeks, then increase to 0.5 mg weekly thereafter if able to tolerate., Disp: 3 mL, Rfl: 6    simvastatin (ZOCOR) 20 MG tablet, Take 1 tablet by mouth Daily., Disp: , Rfl:     tiZANidine (ZANAFLEX) 4 MG tablet, , Disp: , Rfl:     Current outpatient and discharge medications have been reconciled for the patient.  Reviewed by: Geovanni Pitt MD       Allergies   Allergen Reactions    Ciprofloxacin Hives    Hydrocodone Hives    [place]adam[place] Nut Oil Anaphylaxis    Nitrofurantoin Rash    Nuts Anaphylaxis    Sulfamethoxazole-Trimethoprim Rash    Tramadol Hcl Hives    [place] Pollen Allergy Skin Test Hives    Trimethoprim Unknown - Low Severity       Family History   Problem Relation Age of Onset    Diabetes Mother     Hypertension Mother     Diabetes Father     Hypertension Father     Stroke Father     Diabetes Paternal Grandmother     Diabetes Paternal Grandfather     Diabetes Other         aunt    Cancer Other         brain cancer - uncle    Cancer Other         breast cancer - aunt    No Known Problems Sister     No Known Problems Brother     No Known Problems Maternal Aunt     No Known Problems Maternal Uncle     No Known Problems Paternal Aunt     No Known Problems Paternal Uncle     No Known Problems Maternal Grandmother     No Known Problems Maternal Grandfather     Anemia Neg Hx     Arrhythmia Neg Hx      Asthma Neg Hx     Clotting disorder Neg Hx     Fainting Neg Hx     Heart attack Neg Hx     Heart disease Neg Hx     Heart failure Neg Hx     Hyperlipidemia Neg Hx        Past Surgical History:   Procedure Laterality Date    BLADDER SURGERY  2011    bladder mesh    CHOLECYSTECTOMY      COLONOSCOPY      ENDOSCOPY      EYE SURGERY  2018    HYSTERECTOMY  2009       Past Medical History:   Diagnosis Date    CAD (coronary artery disease)     Chronic renal insufficiency, stage III (moderate) 01/09/2019    Diabetes mellitus     Diabetes mellitus type I     GERD (gastroesophageal reflux disease)     Hyperlipidemia     Hypertension     Myocardial infarction 2011    Vitamin D deficiency        Family History   Problem Relation Age of Onset    Diabetes Mother     Hypertension Mother     Diabetes Father     Hypertension Father     Stroke Father     Diabetes Paternal Grandmother     Diabetes Paternal Grandfather     Diabetes Other         aunt    Cancer Other         brain cancer - uncle    Cancer Other         breast cancer - aunt    No Known Problems Sister     No Known Problems Brother     No Known Problems Maternal Aunt     No Known Problems Maternal Uncle     No Known Problems Paternal Aunt     No Known Problems Paternal Uncle     No Known Problems Maternal Grandmother     No Known Problems Maternal Grandfather     Anemia Neg Hx     Arrhythmia Neg Hx     Asthma Neg Hx     Clotting disorder Neg Hx     Fainting Neg Hx     Heart attack Neg Hx     Heart disease Neg Hx     Heart failure Neg Hx     Hyperlipidemia Neg Hx        Social History     Socioeconomic History    Marital status:      Spouse name: Peterson De La Fuente    Number of children: 3    Years of education: 12   Tobacco Use    Smoking status: Never     Passive exposure: Never    Smokeless tobacco: Never   Vaping Use    Vaping status: Never Used   Substance and Sexual Activity    Alcohol use: No    Drug use: No    Sexual activity: Yes     Partners: Male      Birth control/protection: Hysterectomy, Surgical               Objective:       Physical Exam    There were no vitals taken for this visit.  The patient is alert, oriented and in no distress.    Vital signs as noted above.    Head and neck revealed no carotid bruits or jugular venous distension.  No thyromegaly or lymphadenopathy is present.    Lungs clear.  No wheezing.  Breath sounds are normal bilaterally.    Heart normal first and second heart sounds.  No murmur..  No pericardial rub is present.  No gallop is present.    Abdomen soft and nontender.  No organomegaly is present.    Extremities revealed good peripheral pulses without any pedal edema.    Skin warm and dry.    Musculoskeletal system is grossly normal.    CNS grossly normal.          No diagnosis found.LAB RESULTS (LAST 7 DAYS)    CBC        BMP        CMP         BNP        TROPONIN        CoAg        Creatinine Clearance  CrCl cannot be calculated (Patient's most recent lab result is older than the maximum 30 days allowed.).    ABG        Radiology  No radiology results for the last day    EKG  Procedures    Stress test      Echocardiogram      Cardiac catheterization  No results found for this or any previous visit.          Assessment and Plan       There are no diagnoses linked to this encounter.     1. Chronic coronary artery disease (Primary)  She has no evidence of coronary disease per heart cath in 2012.  I provided education regarding risk factors modification, weight loss  2. Mixed hyperlipidemia   Lipid panel showed LDL of 67 down from 72, HDL 49, triglyceride 190 and total cholesterol 155.  Currently on simvastatin  3. Hypertension, benign  Well-controlled on lisinopril and metoprolol  Plan to repeat renal function. She will have labs drawn in January 2024  4. Type 2 diabetes mellitus treated with insulin  Most recent A1c was 7.4 down from 7.9  Continue insulin  Follow-up with PCP  Repeat HbA1c in [place] before  5. Chronic renal impairment,  stage 3 (moderate), unspecified whether stage 3a or 3b CKD  Most recent creatinine is 1.3 with GFR of 5  Avoid nephrotoxic agents  Repeat renal function   6.  Obesity  BMI 34.93.  She weighs 191 pounds.  She is down from 220 pounds.  Diet, exercise, weight loss discussed with the patient.  I also encouraged her to undergo screening and treatment for sleep apnea  She is currently on Ozempic.  7. Leg edema  She complains of leg edema and some shortness of breath.  Echocardiogram shows preserved LV function, grade 2 diastolic dysfunction.  LV strain -10.8%.  Mild mitral regurgitation with RVSP of 55 mmHg.  Lasix as needed.

## 2024-05-01 ENCOUNTER — OFFICE VISIT (OUTPATIENT)
Dept: CARDIOLOGY | Facility: CLINIC | Age: 59
End: 2024-05-01
Payer: MEDICARE

## 2024-05-01 VITALS
DIASTOLIC BLOOD PRESSURE: 50 MMHG | SYSTOLIC BLOOD PRESSURE: 98 MMHG | BODY MASS INDEX: 35.15 KG/M2 | HEART RATE: 74 BPM | WEIGHT: 191 LBS | OXYGEN SATURATION: 98 % | HEIGHT: 62 IN

## 2024-05-01 DIAGNOSIS — E11.610 TYPE 2 DIABETES MELLITUS WITH DIABETIC NEUROPATHIC ARTHROPATHY, WITHOUT LONG-TERM CURRENT USE OF INSULIN: ICD-10-CM

## 2024-05-01 DIAGNOSIS — Z83.3 FAMILY HISTORY OF DIABETES MELLITUS: ICD-10-CM

## 2024-05-01 DIAGNOSIS — I25.10 CHRONIC CORONARY ARTERY DISEASE: Primary | ICD-10-CM

## 2024-05-01 DIAGNOSIS — Z82.3 FAMILY HISTORY OF STROKE: ICD-10-CM

## 2024-05-01 DIAGNOSIS — R76.8 POSITIVE ANA (ANTINUCLEAR ANTIBODY): ICD-10-CM

## 2024-05-01 DIAGNOSIS — K21.9 GASTROESOPHAGEAL REFLUX DISEASE WITHOUT ESOPHAGITIS: ICD-10-CM

## 2024-05-01 DIAGNOSIS — N18.30 CHRONIC RENAL IMPAIRMENT, STAGE 3 (MODERATE), UNSPECIFIED WHETHER STAGE 3A OR 3B CKD: ICD-10-CM

## 2024-06-09 DIAGNOSIS — I10 HYPERTENSION, BENIGN: ICD-10-CM

## 2024-06-10 RX ORDER — LISINOPRIL 10 MG/1
10 TABLET ORAL DAILY
Qty: 90 TABLET | Refills: 1 | Status: SHIPPED | OUTPATIENT
Start: 2024-06-10

## 2024-06-11 RX ORDER — PROCHLORPERAZINE 25 MG/1
SUPPOSITORY RECTAL
Qty: 1 EACH | Refills: 1 | Status: SHIPPED | OUTPATIENT
Start: 2024-06-11 | End: 2024-06-14 | Stop reason: SDUPTHER

## 2024-06-14 RX ORDER — PROCHLORPERAZINE 25 MG/1
1 SUPPOSITORY RECTAL
Qty: 1 EACH | Refills: 1 | Status: SHIPPED | OUTPATIENT
Start: 2024-06-14

## 2024-07-01 ENCOUNTER — TRANSCRIBE ORDERS (OUTPATIENT)
Dept: ADMINISTRATIVE | Facility: HOSPITAL | Age: 59
End: 2024-07-01
Payer: MEDICARE

## 2024-07-01 DIAGNOSIS — Z12.31 BREAST CANCER SCREENING BY MAMMOGRAM: Primary | ICD-10-CM

## 2024-07-08 ENCOUNTER — HOSPITAL ENCOUNTER (OUTPATIENT)
Dept: MAMMOGRAPHY | Facility: HOSPITAL | Age: 59
Discharge: HOME | End: 2024-07-08
Admitting: NURSE PRACTITIONER
Payer: MEDICARE

## 2024-07-08 DIAGNOSIS — Z12.31 BREAST CANCER SCREENING BY MAMMOGRAM: ICD-10-CM

## 2024-07-08 PROCEDURE — 77067 SCR MAMMO BI INCL CAD: CPT

## 2024-07-08 PROCEDURE — 77063 BREAST TOMOSYNTHESIS BI: CPT

## 2024-07-23 ENCOUNTER — OFFICE (AMBULATORY)
Dept: URBAN - METROPOLITAN AREA CLINIC 64 | Facility: CLINIC | Age: 59
End: 2024-07-23
Payer: COMMERCIAL

## 2024-07-23 VITALS
HEART RATE: 77 BPM | WEIGHT: 187 LBS | SYSTOLIC BLOOD PRESSURE: 126 MMHG | HEIGHT: 62 IN | DIASTOLIC BLOOD PRESSURE: 69 MMHG

## 2024-07-23 DIAGNOSIS — K31.84 GASTROPARESIS: ICD-10-CM

## 2024-07-23 DIAGNOSIS — K21.9 GASTRO-ESOPHAGEAL REFLUX DISEASE WITHOUT ESOPHAGITIS: ICD-10-CM

## 2024-07-23 DIAGNOSIS — R19.7 DIARRHEA, UNSPECIFIED: ICD-10-CM

## 2024-07-23 PROCEDURE — 99213 OFFICE O/P EST LOW 20 MIN: CPT | Performed by: NURSE PRACTITIONER

## 2024-07-25 ENCOUNTER — OFFICE VISIT (OUTPATIENT)
Dept: ENDOCRINOLOGY | Facility: CLINIC | Age: 59
End: 2024-07-25
Payer: MEDICARE

## 2024-07-25 VITALS
DIASTOLIC BLOOD PRESSURE: 75 MMHG | HEART RATE: 77 BPM | HEIGHT: 62 IN | OXYGEN SATURATION: 96 % | BODY MASS INDEX: 33.31 KG/M2 | SYSTOLIC BLOOD PRESSURE: 122 MMHG | WEIGHT: 181 LBS

## 2024-07-25 DIAGNOSIS — E78.2 MIXED HYPERLIPIDEMIA: ICD-10-CM

## 2024-07-25 DIAGNOSIS — E10.65 TYPE 1 DIABETES MELLITUS WITH HYPERGLYCEMIA: Primary | ICD-10-CM

## 2024-07-25 DIAGNOSIS — I10 HYPERTENSION, BENIGN: ICD-10-CM

## 2024-07-25 DIAGNOSIS — E11.42 DIABETIC PERIPHERAL NEUROPATHY: ICD-10-CM

## 2024-07-25 RX ORDER — FLUCONAZOLE 150 MG/1
TABLET ORAL
COMMUNITY

## 2024-07-25 RX ORDER — CEPHALEXIN 500 MG/1
CAPSULE ORAL
COMMUNITY

## 2024-07-25 RX ORDER — SEMAGLUTIDE 0.68 MG/ML
INJECTION, SOLUTION SUBCUTANEOUS
Qty: 3 ML | Refills: 6 | Status: SHIPPED | OUTPATIENT
Start: 2024-07-25

## 2024-07-25 RX ORDER — AMOXICILLIN AND CLAVULANATE POTASSIUM 875; 125 MG/1; MG/1
1 TABLET, FILM COATED ORAL EVERY 12 HOURS SCHEDULED
COMMUNITY
Start: 2024-05-24 | End: 2024-07-25

## 2024-07-25 RX ORDER — OXYCODONE HYDROCHLORIDE AND ACETAMINOPHEN 5; 325 MG/1; MG/1
1 TABLET ORAL 2 TIMES DAILY PRN
COMMUNITY

## 2024-07-25 NOTE — PROGRESS NOTES
Endocrine Progress Note Outpatient     Patient Care Team:  Tobias Segura APRN as PCP - General (Nurse Practitioner)      Chief Complaint: Follow-up type 1 diabetes    HPI: 58-year-old female with history of type 1 diabetes since 1997, also history of hypertension, hyperlipidemia and diabetic neuropathy is here for follow-up.    For type 1 diabetes she is currently on OmniPod 5 insulin pump with Dexcom monitoring system now.      Current insulin pump settings are as follows basal rate from midnight to midnight is 2.5 units/h.  Her insulin carb ratio is at 1 unit for each 4.3 g of carbohydrate at midnight and 1 unit for each 3.7 g of carbohydrate at 6 4 PM.  A correction factor is 1 unit for each 10 mg blood sugar with a target blood sugar of 110 and active insulin is 4 hours.  Has not been hospitalized with low or high blood sugars since last seen.  She is also on Ozempic and has lost almost 40 pounds of weight.    Hypertension: Well-controlled    Hyperlipidemia: On simvastatin and fenofibrate    Diabetic neuropathy: She has right foot drop    Past Medical History:   Diagnosis Date    CAD (coronary artery disease)     Chronic renal insufficiency, stage III (moderate) 01/09/2019    Diabetes mellitus     Diabetes mellitus type I     GERD (gastroesophageal reflux disease)     Hyperlipidemia     Hypertension     Myocardial infarction 2011    Vitamin D deficiency        Social History     Socioeconomic History    Marital status:      Spouse name: Peterson De La Fuente    Number of children: 3    Years of education: 12   Tobacco Use    Smoking status: Never     Passive exposure: Never    Smokeless tobacco: Never   Vaping Use    Vaping status: Never Used   Substance and Sexual Activity    Alcohol use: No    Drug use: No    Sexual activity: Yes     Partners: Male     Birth control/protection: Hysterectomy, Surgical       Family History   Problem Relation Age of Onset    Diabetes Mother     Hypertension Mother      Diabetes Father     Hypertension Father     Stroke Father     Diabetes Paternal Grandmother     Diabetes Paternal Grandfather     Diabetes Other         aunt    Cancer Other         brain cancer - uncle    Cancer Other         breast cancer - aunt    No Known Problems Sister     No Known Problems Brother     No Known Problems Maternal Aunt     No Known Problems Maternal Uncle     No Known Problems Paternal Aunt     No Known Problems Paternal Uncle     No Known Problems Maternal Grandmother     No Known Problems Maternal Grandfather     Anemia Neg Hx     Arrhythmia Neg Hx     Asthma Neg Hx     Clotting disorder Neg Hx     Fainting Neg Hx     Heart attack Neg Hx     Heart disease Neg Hx     Heart failure Neg Hx     Hyperlipidemia Neg Hx        Allergies   Allergen Reactions    Hydrocodone Hives    Macadamia Nut Oil Anaphylaxis    Nuts Anaphylaxis    [place] Anaphylaxis    Black [place] Pollen Allergy Skin Test Hives    Ciprofloxacin Hives    Tramadol Hcl Hives    Trimethoprim Unknown - Low Severity    Nitrofurantoin Rash    Sulfamethoxazole-Trimethoprim Rash       ROS:   Constitutional:  Admit fatigue, tiredness.    Eyes:  Denies change in visual acuity   HENT:  Denies nasal congestion or sore throat   Respiratory: denies cough, shortness of breath.   Cardiovascular:  denies chest pain, edema   GI:  Denies abdominal pain, nausea, vomiting.   Musculoskeletal:  Denies back pain or joint pain   Integument:  Denies dry skin and rash   Neurologic:  Denies headache, focal weakness or sensory changes   Endocrine:  Denies polyuria or polydipsia   Psychiatric:  Denies depression or anxiety      Vitals:    07/25/24 1225   BP: 122/75   Pulse: 77   SpO2: 96%     BMI: 33.1  Physical Exam:  GEN: NAD, conversant  CHEST: CTA  CVS: RRR  Feet: Has callus on left sole, no ulcer seen    Results Review:     I reviewed the patient's new clinical results.    Microalbumin / Creatinine Urine Ratio - Urine, Clean Catch  (06/13/2024)    Comprehensive Metabolic Panel (06/17/2024 11:19)    Lipid Panel (06/17/2024 11:19)    Hemoglobin A1c (06/17/2024 11:19)    Medication Review: Reviewed.       Current Outpatient Medications:     acetaminophen-codeine (TYLENOL #3) 300-30 MG per tablet, Take 1 tablet by mouth 2 (Two) Times a Day., Disp: , Rfl:     aspirin 81 MG tablet, Take 1 tablet by mouth Daily., Disp: , Rfl:     Biotin 5000 MCG sublingual tablet, , Disp: , Rfl:     celecoxib (CeleBREX) 100 MG capsule, TAKE 1 CAPSULE BY MOUTH TWICE DAILY AS NEEDED WITH FOOD FOR ARTHRITIS, Disp: , Rfl:     cephalexin (KEFLEX) 500 MG capsule, TAKE 1 CAPSULE BY MOUTH EVERY 6 HOURS FOR 10 DAYS, Disp: , Rfl:     cetirizine (zyrTEC) 10 MG tablet, Take 1 tablet by mouth Daily., Disp: , Rfl:     colesevelam (WELCHOL) 625 MG tablet, Take 1 tablet by mouth 2 (Two) Times a Day Before Meals., Disp: , Rfl:     Continuous Blood Gluc  (Dexcom G6 ) device, Use 1 Device Daily., Disp: 1 each, Rfl: 1    Continuous Blood Gluc Sensor (Dexcom G6 Sensor), Use Every 10 (Ten) Days., Disp: 2 each, Rfl: 6    Continuous Glucose Transmitter (Dexcom G6 Transmitter) misc, 1 Device by Other route Every 3 (Three) Months., Disp: 1 each, Rfl: 1    EPINEPHrine (EPIPEN) 0.3 MG/0.3ML solution auto-injector injection, epinephrine 0.3 mg/0.3 mL injection, auto-injector, Disp: , Rfl:     fluconazole (DIFLUCAN) 150 MG tablet, TAKE 1 TABLET BY MOUTH EVERY 72 HOURS FOR 10 DAYS, Disp: , Rfl:     FLUoxetine (PROzac) 20 MG tablet, , Disp: , Rfl:     gabapentin (NEURONTIN) 300 MG capsule, Take 1 capsule by mouth 3 (Three) Times a Day., Disp: 270 capsule, Rfl: 0    glucose blood (Contour Next Test) test strip, USE 1 STRIP TO CHECK GLUCOSE 4 TIMES DAILY, Disp: 400 each, Rfl: 0    glycopyrrolate (ROBINUL) 2 MG tablet, Take 1 tablet by mouth 2 (Two) Times a Day., Disp: , Rfl:     hydroxychloroquine (PLAQUENIL) 200 MG tablet, Take 1 tablet by mouth Daily., Disp: 90 tablet, Rfl: 1     Insulin Disposable Pump (Omnipod 5 G6 Intro, Gen 5,) kit, Use 1 package Daily., Disp: 1 kit, Rfl: 0    Insulin Disposable Pump (Omnipod 5 G6 Pod, Gen 5,) misc, Use 1 package Daily. Change pod as directed daily based on insulin dose, Disp: 30 each, Rfl: 6    Insulin Lispro (HumaLOG) 100 UNIT/ML injection, USE AS DIRECTED IN INSULIN PUMP. MAX DAILY DOSE 170 UNITS PER DAY (E10.65), Disp: 150 mL, Rfl: 6    Lancets (freestyle) lancets, 5 times daily, Disp: 400 each, Rfl: 4    lisinopril (PRINIVIL,ZESTRIL) 10 MG tablet, Take 1 tablet by mouth once daily, Disp: 90 tablet, Rfl: 1    metoprolol tartrate (LOPRESSOR) 25 MG tablet, Take 1 tablet by mouth twice daily, Disp: 180 tablet, Rfl: 3    omeprazole (priLOSEC) 40 MG capsule, omeprazole Take 1 Capsule (oral) 1 time per day No date recorded capsule,delayed release(DR/EC) 1 time per day oral No set duration recorded No set duration amount recorded active 40 mg, Disp: , Rfl:     oxyCODONE-acetaminophen (PERCOCET) 5-325 MG per tablet, Take 1 tablet by mouth 2 (Two) Times a Day As Needed., Disp: , Rfl:     Semaglutide,0.25 or 0.5MG/DOS, (Ozempic, 0.25 or 0.5 MG/DOSE,) 2 MG/3ML solution pen-injector, Inject 0.25 mg under the skin once weekly for 4 weeks, then increase to 0.5 mg weekly thereafter if able to tolerate., Disp: 3 mL, Rfl: 6    simvastatin (ZOCOR) 20 MG tablet, Take 1 tablet by mouth Daily., Disp: , Rfl:     tiZANidine (ZANAFLEX) 4 MG tablet, , Disp: , Rfl:       Assessment & Plan   1.  Diabetes mellitus type 1 with hyperglycemia: Well-controlled with A1c at 6.2%.  At this time we will continue current regimen.  Advised to always keep glucose source in case of low blood sugars and continue to work on diet and activity.    2.  Hypertension: Well-controlled, continue current medications.    3.  Hyperlipidemia: Well-controlled, continue simvastatin and fenofibrate.    4.  Diabetic neuropathy: Stable.    Assessment and plan from January 18, 2024 reviewed and updated.           Marely Peralta MD FACE.

## 2024-08-13 RX ORDER — SEMAGLUTIDE 0.68 MG/ML
0.5 INJECTION, SOLUTION SUBCUTANEOUS WEEKLY
Qty: 3 ML | Refills: 6 | Status: SHIPPED | OUTPATIENT
Start: 2024-08-13

## 2024-08-27 RX ORDER — PROCHLORPERAZINE 25 MG/1
SUPPOSITORY RECTAL
Qty: 3 EACH | Refills: 6 | Status: SHIPPED | OUTPATIENT
Start: 2024-08-27

## 2024-10-29 RX ORDER — INSULIN PMP CART,AUT,G6/7,CNTR
EACH SUBCUTANEOUS
Qty: 30 EACH | Refills: 6 | Status: SHIPPED | OUTPATIENT
Start: 2024-10-29

## 2024-12-02 DIAGNOSIS — I10 HYPERTENSION, BENIGN: ICD-10-CM

## 2024-12-02 RX ORDER — LISINOPRIL 10 MG/1
10 TABLET ORAL DAILY
Qty: 90 TABLET | Refills: 0 | Status: SHIPPED | OUTPATIENT
Start: 2024-12-02

## 2024-12-26 ENCOUNTER — TELEPHONE (OUTPATIENT)
Dept: ENDOCRINOLOGY | Facility: CLINIC | Age: 59
End: 2024-12-26
Payer: MEDICARE

## 2025-02-01 NOTE — PROGRESS NOTES
Encounter Date:02/05/2025        Patient ID: Faye De La Fuente is a 59 y.o. female.      Chief Complaint:      History of Present Illness  59 years old pleasant woman with hypertension, hyperlipidemia, diabetes,?  Coronary artery disease.  She comes in with no specific complaints and denies any chest pain. She does report some shortness of breath and bilateral leg edema.  She also denies any previous history of coronary artery disease.  In the past she has had allergies and anaphylactic shock with nuts for which she received epinephrine injection.  She suffered from a cardiac arrest requiring a heart cath which did not show any evidence of obstructive coronary disease.     ECG shows normal sinus rhythm.  Heart rate 73, MS interval 124, QRS 68 and QTc 438 ms.  This is unchanged when compared to prior ECG.     Current cardiac medicines include aspirin, lisinopril, metoprolol.  She is also on simvastatin and Ozempic.    The following portions of the patient's history were reviewed and updated as appropriate: allergies, current medications, past family history, past medical history, past social history, past surgical history, and problem list.    Review of Systems   Constitutional: Negative for malaise/fatigue.   Cardiovascular:  Negative for chest pain, dyspnea on exertion, leg swelling and palpitations.   Respiratory:  Negative for cough and shortness of breath.    Gastrointestinal:  Negative for abdominal pain, nausea and vomiting.   Neurological:  Negative for dizziness, focal weakness, headaches, light-headedness and numbness.   All other systems reviewed and are negative.        Current Outpatient Medications:     acetaminophen-codeine (TYLENOL #3) 300-30 MG per tablet, Take 1 tablet by mouth 2 (Two) Times a Day., Disp: , Rfl:     aspirin 81 MG tablet, Take 1 tablet by mouth Daily., Disp: , Rfl:     Biotin 5000 MCG sublingual tablet, , Disp: , Rfl:     celecoxib (CeleBREX) 100 MG capsule, TAKE 1 CAPSULE BY  MOUTH TWICE DAILY AS NEEDED WITH FOOD FOR ARTHRITIS, Disp: , Rfl:     cetirizine (zyrTEC) 10 MG tablet, Take 1 tablet by mouth Daily., Disp: , Rfl:     colesevelam (WELCHOL) 625 MG tablet, Take 1 tablet by mouth 2 (Two) Times a Day Before Meals., Disp: , Rfl:     Continuous Blood Gluc  (Dexcom G6 ) device, Use 1 Device Daily., Disp: 1 each, Rfl: 1    Continuous Glucose Sensor (Dexcom G6 Sensor), USE 1 EVERY 10 DAYS, Disp: 3 each, Rfl: 6    Continuous Glucose Transmitter (Dexcom G6 Transmitter) misc, 1 Device by Other route Every 3 (Three) Months., Disp: 1 each, Rfl: 1    EPINEPHrine (EPIPEN) 0.3 MG/0.3ML solution auto-injector injection, epinephrine 0.3 mg/0.3 mL injection, auto-injector, Disp: , Rfl:     FLUoxetine (PROzac) 20 MG tablet, Take 1 tablet by mouth Daily., Disp: , Rfl:     gabapentin (NEURONTIN) 300 MG capsule, Take 1 capsule by mouth 3 (Three) Times a Day., Disp: 270 capsule, Rfl: 0    glucose blood (Contour Next Test) test strip, USE 1 STRIP TO CHECK GLUCOSE 4 TIMES DAILY, Disp: 400 each, Rfl: 0    glycopyrrolate (ROBINUL) 2 MG tablet, Take 1 tablet by mouth 2 (Two) Times a Day., Disp: , Rfl:     hydroxychloroquine (PLAQUENIL) 200 MG tablet, Take 1 tablet by mouth Daily., Disp: 90 tablet, Rfl: 1    Insulin Disposable Pump (Omnipod 5 [id number] Pods Gen 5) misc, CHANGE POD AS DIRECTED DAILY BASED ON INSULIN DOSE, Disp: 30 each, Rfl: 6    Insulin Disposable Pump (Omnipod 5 G6 Intro, Gen 5,) kit, Use 1 package Daily., Disp: 1 kit, Rfl: 0    Insulin Lispro (HumaLOG) 100 UNIT/ML injection, USE AS DIRECTED IN INSULIN PUMP. MAX DAILY DOSE 170 UNITS PER DAY (E10.65), Disp: 150 mL, Rfl: 6    Lancets (freestyle) lancets, 5 times daily, Disp: 400 each, Rfl: 4    lisinopril (PRINIVIL,ZESTRIL) 10 MG tablet, Take 1 tablet by mouth once daily, Disp: 90 tablet, Rfl: 0    metoprolol tartrate (LOPRESSOR) 25 MG tablet, Take 1 tablet by mouth twice daily, Disp: 180 tablet, Rfl: 3    omeprazole (priLOSEC) 40  MG capsule, omeprazole Take 1 Capsule (oral) 1 time per day No date recorded capsule,delayed release(DR/EC) 1 time per day oral No set duration recorded No set duration amount recorded active 40 mg, Disp: , Rfl:     oxyCODONE-acetaminophen (PERCOCET) 5-325 MG per tablet, Take 1 tablet by mouth 2 (Two) Times a Day As Needed., Disp: , Rfl:     Semaglutide,0.25 or 0.5MG/DOS, (Ozempic, 0.25 or 0.5 MG/DOSE,) 2 MG/3ML solution pen-injector, Inject 0.5 mg under the skin into the appropriate area as directed 1 (One) Time Per Week., Disp: 3 mL, Rfl: 6    simvastatin (ZOCOR) 20 MG tablet, Take 1 tablet by mouth Daily., Disp: , Rfl:     tiZANidine (ZANAFLEX) 4 MG tablet, Take 1 tablet by mouth Every 8 (Eight) Hours As Needed for Muscle Spasms., Disp: , Rfl:     Current outpatient and discharge medications have been reconciled for the patient.  Reviewed by: Geovanni Pitt MD       Allergies   Allergen Reactions    Hydrocodone Hives    Macadamia Nut Oil Anaphylaxis    Nuts Anaphylaxis    [place] Anaphylaxis    Black [place] Pollen Allergy Skin Test Hives    Ciprofloxacin Hives    Tramadol Hcl Hives    Trimethoprim Unknown - Low Severity    Nitrofurantoin Rash    Sulfamethoxazole-Trimethoprim Rash       Family History   Problem Relation Age of Onset    Diabetes Mother     Hypertension Mother     Diabetes Father     Hypertension Father     Stroke Father     Diabetes Paternal Grandmother     Diabetes Paternal Grandfather     Diabetes Other         aunt    Cancer Other         brain cancer - uncle    Cancer Other         breast cancer - aunt    No Known Problems Sister     No Known Problems Brother     No Known Problems Maternal Aunt     No Known Problems Maternal Uncle     No Known Problems Paternal Aunt     No Known Problems Paternal Uncle     No Known Problems Maternal Grandmother     No Known Problems Maternal Grandfather     Anemia Neg Hx     Arrhythmia Neg Hx     Asthma Neg Hx     Clotting disorder Neg Hx     Fainting Neg Hx      Heart attack Neg Hx     Heart disease Neg Hx     Heart failure Neg Hx     Hyperlipidemia Neg Hx        Past Surgical History:   Procedure Laterality Date    BLADDER SURGERY  2011    bladder mesh    CHOLECYSTECTOMY      COLONOSCOPY      ENDOSCOPY      EYE SURGERY  2018    HYSTERECTOMY  2009       Past Medical History:   Diagnosis Date    CAD (coronary artery disease)     Chronic renal insufficiency, stage III (moderate) 01/09/2019    Diabetes mellitus     Diabetes mellitus type I     GERD (gastroesophageal reflux disease)     Hyperlipidemia     Hypertension     Myocardial infarction 2011    Vitamin D deficiency        Family History   Problem Relation Age of Onset    Diabetes Mother     Hypertension Mother     Diabetes Father     Hypertension Father     Stroke Father     Diabetes Paternal Grandmother     Diabetes Paternal Grandfather     Diabetes Other         aunt    Cancer Other         brain cancer - uncle    Cancer Other         breast cancer - aunt    No Known Problems Sister     No Known Problems Brother     No Known Problems Maternal Aunt     No Known Problems Maternal Uncle     No Known Problems Paternal Aunt     No Known Problems Paternal Uncle     No Known Problems Maternal Grandmother     No Known Problems Maternal Grandfather     Anemia Neg Hx     Arrhythmia Neg Hx     Asthma Neg Hx     Clotting disorder Neg Hx     Fainting Neg Hx     Heart attack Neg Hx     Heart disease Neg Hx     Heart failure Neg Hx     Hyperlipidemia Neg Hx        Social History     Socioeconomic History    Marital status:      Spouse name: Peterson De La Fuente    Number of children: 3    Years of education: 12   Tobacco Use    Smoking status: Never     Passive exposure: Never    Smokeless tobacco: Never   Vaping Use    Vaping status: Never Used   Substance and Sexual Activity    Alcohol use: No    Drug use: No    Sexual activity: Yes     Partners: Male     Birth control/protection: Hysterectomy, Surgical               Objective:  "      Physical Exam    BP 99/57 (BP Location: Right arm, Patient Position: Sitting, Cuff Size: Large Adult)   Pulse 72   Ht 157.5 cm (62\")   Wt 80.7 kg (178 lb)   SpO2 100%   BMI 32.56 kg/m²   The patient is alert, oriented and in no distress.    Vital signs as noted above.    Head and neck revealed no carotid bruits or jugular venous distension.  No thyromegaly or lymphadenopathy is present.    Lungs clear.  No wheezing.  Breath sounds are normal bilaterally.    Heart normal first and second heart sounds.  No murmur..  No pericardial rub is present.  No gallop is present.    Abdomen soft and nontender.  No organomegaly is present.    Extremities revealed good peripheral pulses without any pedal edema.    Skin warm and dry.    Musculoskeletal system is grossly normal.    CNS grossly normal.           Diagnosis Plan   1. Hypertension, benign        2. Chronic coronary artery disease        3. Type 2 diabetes mellitus with diabetic neuropathic arthropathy, without long-term current use of insulin        4. Gastroesophageal reflux disease without esophagitis        5. Positive ARTHUR (antinuclear antibody)        6. Family history of stroke        7. Mixed hyperlipidemia        8. Chronic renal impairment, stage 3 (moderate), unspecified whether stage 3a or 3b CKD        9. Family history of diabetes mellitus        10. Type 2 diabetes mellitus treated with insulin        11. Class 2 obesity due to excess calories without serious comorbidity with body mass index (BMI) of 38.0 to 38.9 in adult        12. Bilateral leg edema        LAB RESULTS (LAST 7 DAYS)    CBC        BMP        CMP         BNP        TROPONIN        CoAg        Creatinine Clearance  CrCl cannot be calculated (Patient's most recent lab result is older than the maximum 30 days allowed.).    ABG        Radiology  No radiology results for the last day    EKG  Procedures    Stress test      Echocardiogram      Cardiac catheterization  No results found " for this or any previous visit.          Assessment and Plan       Diagnoses and all orders for this visit:    1. Hypertension, benign (Primary)    2. Chronic coronary artery disease    3. Type 2 diabetes mellitus with diabetic neuropathic arthropathy, without long-term current use of insulin    4. Gastroesophageal reflux disease without esophagitis    5. Positive ARTHUR (antinuclear antibody)    6. Family history of stroke    7. Mixed hyperlipidemia    8. Chronic renal impairment, stage 3 (moderate), unspecified whether stage 3a or 3b CKD    9. Family history of diabetes mellitus    10. Type 2 diabetes mellitus treated with insulin    11. Class 2 obesity due to excess calories without serious comorbidity with body mass index (BMI) of 38.0 to 38.9 in adult    12. Bilateral leg edema         1. Chronic coronary artery disease (Primary)  She has no evidence of coronary disease per heart cath in 2012.  I provided education regarding risk factors modification, weight loss  2. Mixed hyperlipidemia   Lipid panel showed LDL of 67 down from 72, HDL 49, triglyceride 190 and total cholesterol 155.  Currently on simvastatin  Repeat lipid panel showed LDL of 35.  LDL is at goal  We will plan to stop simvastatin during next visit.  3. Hypertension, benign  Well-controlled on lisinopril and metoprolol  Blood pressure 99/57 today.  I will discontinue lisinopril  4. Type 2 diabetes mellitus treated with insulin  Most recent A1c was 6.2  Currently on Ozempic  5. Chronic renal impairment, stage 3 (moderate), unspecified whether stage 3a or 3b CKD  Most recent creatinine is 1.3 with GFR of 5  Avoid nephrotoxic agents  Repeat renal function   Discontinue lisinopril  6.  Obesity  BMI 32.5.  She weighs 178 pounds.  She is down from 220 pounds.  Diet, exercise, weight loss discussed with the patient.  I also encouraged her to undergo screening and treatment for sleep apnea  She is currently on Ozempic.  7. Leg edema  Leg edema and shortness  of breath have resolved after weight loss.  Echocardiogram shows preserved LV function, grade 2 diastolic dysfunction.  LV strain -10.8%.  Mild mitral regurgitation with RVSP of 55 mmHg.

## 2025-02-05 ENCOUNTER — OFFICE VISIT (OUTPATIENT)
Dept: CARDIOLOGY | Facility: CLINIC | Age: 60
End: 2025-02-05
Payer: MEDICARE

## 2025-02-05 VITALS
HEART RATE: 72 BPM | OXYGEN SATURATION: 100 % | WEIGHT: 178 LBS | DIASTOLIC BLOOD PRESSURE: 57 MMHG | BODY MASS INDEX: 32.76 KG/M2 | SYSTOLIC BLOOD PRESSURE: 99 MMHG | HEIGHT: 62 IN

## 2025-02-05 DIAGNOSIS — Z82.3 FAMILY HISTORY OF STROKE: ICD-10-CM

## 2025-02-05 DIAGNOSIS — E11.610 TYPE 2 DIABETES MELLITUS WITH DIABETIC NEUROPATHIC ARTHROPATHY, WITHOUT LONG-TERM CURRENT USE OF INSULIN: ICD-10-CM

## 2025-02-05 DIAGNOSIS — I10 HYPERTENSION, BENIGN: Primary | ICD-10-CM

## 2025-02-05 DIAGNOSIS — Z79.4 TYPE 2 DIABETES MELLITUS TREATED WITH INSULIN: ICD-10-CM

## 2025-02-05 DIAGNOSIS — I25.10 CHRONIC CORONARY ARTERY DISEASE: ICD-10-CM

## 2025-02-05 DIAGNOSIS — R76.8 POSITIVE ANA (ANTINUCLEAR ANTIBODY): ICD-10-CM

## 2025-02-05 DIAGNOSIS — E66.09 CLASS 2 OBESITY DUE TO EXCESS CALORIES WITHOUT SERIOUS COMORBIDITY WITH BODY MASS INDEX (BMI) OF 38.0 TO 38.9 IN ADULT: ICD-10-CM

## 2025-02-05 DIAGNOSIS — N18.30 CHRONIC RENAL IMPAIRMENT, STAGE 3 (MODERATE), UNSPECIFIED WHETHER STAGE 3A OR 3B CKD: ICD-10-CM

## 2025-02-05 DIAGNOSIS — R60.0 BILATERAL LEG EDEMA: ICD-10-CM

## 2025-02-05 DIAGNOSIS — E78.2 MIXED HYPERLIPIDEMIA: ICD-10-CM

## 2025-02-05 DIAGNOSIS — K21.9 GASTROESOPHAGEAL REFLUX DISEASE WITHOUT ESOPHAGITIS: ICD-10-CM

## 2025-02-05 DIAGNOSIS — E11.9 TYPE 2 DIABETES MELLITUS TREATED WITH INSULIN: ICD-10-CM

## 2025-02-05 DIAGNOSIS — Z83.3 FAMILY HISTORY OF DIABETES MELLITUS: ICD-10-CM

## 2025-02-05 DIAGNOSIS — E66.812 CLASS 2 OBESITY DUE TO EXCESS CALORIES WITHOUT SERIOUS COMORBIDITY WITH BODY MASS INDEX (BMI) OF 38.0 TO 38.9 IN ADULT: ICD-10-CM

## 2025-03-03 DIAGNOSIS — E10.65 TYPE 1 DIABETES MELLITUS WITH HYPERGLYCEMIA: Primary | ICD-10-CM

## 2025-03-04 DIAGNOSIS — I10 HYPERTENSION, BENIGN: ICD-10-CM

## 2025-03-04 RX ORDER — SEMAGLUTIDE 0.68 MG/ML
INJECTION, SOLUTION SUBCUTANEOUS
Qty: 3 ML | Refills: 3 | Status: SHIPPED | OUTPATIENT
Start: 2025-03-04

## 2025-03-04 RX ORDER — LISINOPRIL 10 MG/1
10 TABLET ORAL DAILY
Qty: 90 TABLET | Refills: 0 | Status: SHIPPED | OUTPATIENT
Start: 2025-03-04

## 2025-03-04 NOTE — TELEPHONE ENCOUNTER
Rx Refill Note  Requested Prescriptions     Signed Prescriptions Disp Refills    lisinopril (PRINIVIL,ZESTRIL) 10 MG tablet 90 tablet 0     Sig: Take 1 tablet by mouth once daily     Authorizing Provider: ZONIA MICHAEL     Ordering User: RANDY WHITE      Last office visit with prescribing clinician: 2/5/2025   Last telemedicine visit with prescribing clinician: Visit date not found   Next office visit with prescribing clinician: Visit date not found                         Would you like a call back once the refill request has been completed: [] Yes [] No    If the office needs to give you a call back, can they leave a voicemail: [] Yes [] No    Randy White MA  03/04/25, 09:01 EST

## 2025-03-07 DIAGNOSIS — E10.65 TYPE 1 DIABETES MELLITUS WITH HYPERGLYCEMIA: ICD-10-CM

## 2025-03-10 RX ORDER — INSULIN LISPRO 100 [IU]/ML
INJECTION, SOLUTION INTRAVENOUS; SUBCUTANEOUS
Qty: 150 ML | Refills: 3 | Status: SHIPPED | OUTPATIENT
Start: 2025-03-10

## 2025-04-03 RX ORDER — PROCHLORPERAZINE 25 MG/1
SUPPOSITORY RECTAL
Qty: 3 EACH | Refills: 5 | Status: SHIPPED | OUTPATIENT
Start: 2025-04-03

## 2025-06-02 RX ORDER — METOPROLOL TARTRATE 25 MG/1
25 TABLET, FILM COATED ORAL 2 TIMES DAILY
Qty: 180 TABLET | Refills: 1 | Status: SHIPPED | OUTPATIENT
Start: 2025-06-02

## 2025-06-02 NOTE — TELEPHONE ENCOUNTER
Rx Refill Note  Requested Prescriptions     Signed Prescriptions Disp Refills    metoprolol tartrate (LOPRESSOR) 25 MG tablet 180 tablet 1     Sig: Take 1 tablet by mouth twice daily     Authorizing Provider: ZONIA MICHAEL     Ordering User: LESLIE AUGUSTIN      Last office visit with prescribing clinician: 2/5/2025   Last telemedicine visit with prescribing clinician: Visit date not found   Next office visit with prescribing clinician: Visit date not found                         Would you like a call back once the refill request has been completed: [] Yes [] No    If the office needs to give you a call back, can they leave a voicemail: [] Yes [] No    Leslie Augustin MA  06/02/25, 08:11 EDT

## 2025-06-13 DIAGNOSIS — E10.65 TYPE 1 DIABETES MELLITUS WITH HYPERGLYCEMIA: ICD-10-CM

## 2025-06-13 RX ORDER — SEMAGLUTIDE 0.68 MG/ML
INJECTION, SOLUTION SUBCUTANEOUS
Qty: 3 ML | Refills: 0 | Status: SHIPPED | OUTPATIENT
Start: 2025-06-13

## 2025-06-27 RX ORDER — PROCHLORPERAZINE 25 MG/1
SUPPOSITORY RECTAL
Qty: 1 EACH | Refills: 0 | Status: SHIPPED | OUTPATIENT
Start: 2025-06-27

## 2025-07-13 DIAGNOSIS — E10.65 TYPE 1 DIABETES MELLITUS WITH HYPERGLYCEMIA: ICD-10-CM

## 2025-07-14 RX ORDER — SEMAGLUTIDE 0.68 MG/ML
INJECTION, SOLUTION SUBCUTANEOUS
Qty: 3 ML | Refills: 0 | OUTPATIENT
Start: 2025-07-14

## 2025-07-16 ENCOUNTER — TELEPHONE (OUTPATIENT)
Dept: ENDOCRINOLOGY | Facility: CLINIC | Age: 60
End: 2025-07-16

## 2025-07-17 DIAGNOSIS — E10.65 TYPE 1 DIABETES MELLITUS WITH HYPERGLYCEMIA: ICD-10-CM

## 2025-07-17 RX ORDER — SEMAGLUTIDE 0.68 MG/ML
INJECTION, SOLUTION SUBCUTANEOUS
Qty: 2 ML | Refills: 0 | Status: SHIPPED | OUTPATIENT
Start: 2025-07-17 | End: 2025-07-17 | Stop reason: SDUPTHER

## 2025-07-17 RX ORDER — SEMAGLUTIDE 0.68 MG/ML
INJECTION, SOLUTION SUBCUTANEOUS
Qty: 2 ML | Refills: 0 | Status: SHIPPED | OUTPATIENT
Start: 2025-07-17

## 2025-08-12 DIAGNOSIS — E10.65 TYPE 1 DIABETES MELLITUS WITH HYPERGLYCEMIA: ICD-10-CM

## 2025-08-12 RX ORDER — SEMAGLUTIDE 0.68 MG/ML
INJECTION, SOLUTION SUBCUTANEOUS
Qty: 3 ML | Refills: 0 | Status: SHIPPED | OUTPATIENT
Start: 2025-08-12